# Patient Record
Sex: FEMALE
[De-identification: names, ages, dates, MRNs, and addresses within clinical notes are randomized per-mention and may not be internally consistent; named-entity substitution may affect disease eponyms.]

---

## 2023-01-04 ENCOUNTER — NURSE TRIAGE (OUTPATIENT)
Dept: OTHER | Facility: CLINIC | Age: 33
End: 2023-01-04

## 2023-01-04 NOTE — TELEPHONE ENCOUNTER
Location of patient: [place]    Subjective: Caller states \"I feel like someone is sitting on my chest\"     Current Symptoms: /95, chest pain, difficulty breathing    Onset: 1 hour ago; sudden    Associated Symptoms: NA    Recommended disposition: Go to ED Now, triaged up for symptoms of chest pain and difficulty breathing    Care advice provided, patient verbalizes understanding; denies any other questions or concerns; instructed to call back for any new or worsening symptoms. Patient/caller agrees to proceed to local Emergency Department    This triage is a result of a call to [address] [place] [address]. Please do not respond to the triage nurse through this encounter. Any subsequent communication should be directly with the patient.     Reason for Disposition   [2] Systolic BP  >= 932 OR Diastolic >= 80 AND [8] not taking BP medications    Protocols used: Blood Pressure - High-ADULT-

## 2023-01-26 PROBLEM — J34.89 NASAL CONGESTION WITH RHINORRHEA: Status: ACTIVE | Noted: 2023-01-26

## 2023-01-26 PROBLEM — R10.11 COLICKY RUQ ABDOMINAL PAIN: Status: RESOLVED | Noted: 2023-01-26 | Resolved: 2023-01-26

## 2023-01-26 PROBLEM — H66.90 OTITIS MEDIA: Status: RESOLVED | Noted: 2023-01-26 | Resolved: 2023-01-26

## 2023-01-26 PROBLEM — E01.0 THYROMEGALY: Status: ACTIVE | Noted: 2023-01-26

## 2023-01-26 PROBLEM — H69.91 DYSFUNCTION OF RIGHT EUSTACHIAN TUBE: Status: ACTIVE | Noted: 2023-01-26

## 2023-01-26 PROBLEM — K21.9 GASTROESOPHAGEAL REFLUX DISEASE WITHOUT ESOPHAGITIS: Status: ACTIVE | Noted: 2023-01-26

## 2023-01-26 PROBLEM — K91.89 POSTCHOLECYSTECTOMY DIARRHEA: Status: ACTIVE | Noted: 2023-01-26

## 2023-01-26 PROBLEM — R43.0 ANOSMIA: Status: ACTIVE | Noted: 2023-01-26

## 2023-01-26 PROBLEM — I88.9 LYMPHADENITIS: Status: RESOLVED | Noted: 2023-01-26 | Resolved: 2023-01-26

## 2023-01-26 PROBLEM — N39.0 ACUTE UTI: Status: ACTIVE | Noted: 2023-01-26

## 2023-01-26 PROBLEM — R51.9 HEADACHE: Status: ACTIVE | Noted: 2023-01-26

## 2023-01-26 PROBLEM — H74.01 TYMPANOSCLEROSIS OF RIGHT EAR: Status: ACTIVE | Noted: 2023-01-26

## 2023-01-26 PROBLEM — H66.91 OTITIS MEDIA, RIGHT: Status: ACTIVE | Noted: 2023-01-26

## 2023-01-26 PROBLEM — R05.9 COUGH IN ADULT PATIENT: Status: ACTIVE | Noted: 2023-01-26

## 2023-01-26 PROBLEM — T75.3XXA MOTION SICKNESS: Status: ACTIVE | Noted: 2023-01-26

## 2023-01-26 PROBLEM — J00 NASOPHARYNGITIS: Status: ACTIVE | Noted: 2023-01-26

## 2023-01-26 PROBLEM — R60.0 LEG EDEMA, LEFT: Status: RESOLVED | Noted: 2023-01-26 | Resolved: 2023-01-26

## 2023-01-26 PROBLEM — H93.8X9 SENSATION OF FULLNESS IN EAR: Status: ACTIVE | Noted: 2023-01-26

## 2023-01-26 PROBLEM — J20.9 ACUTE BRONCHITIS: Status: ACTIVE | Noted: 2023-01-26

## 2023-01-26 PROBLEM — R79.89 ELEVATED SERUM CREATININE: Status: ACTIVE | Noted: 2023-01-26

## 2023-01-26 PROBLEM — Z20.822 SUSPECTED COVID-19 VIRUS INFECTION: Status: RESOLVED | Noted: 2023-01-26 | Resolved: 2023-01-26

## 2023-01-26 PROBLEM — H10.31 ACUTE BACTERIAL CONJUNCTIVITIS OF RIGHT EYE: Status: ACTIVE | Noted: 2023-01-26

## 2023-01-26 PROBLEM — F41.9 ANXIETY: Status: ACTIVE | Noted: 2023-01-26

## 2023-01-26 PROBLEM — H66.90 OTITIS MEDIA: Status: ACTIVE | Noted: 2023-01-26

## 2023-01-26 PROBLEM — R09.81 NASAL CONGESTION WITH RHINORRHEA: Status: ACTIVE | Noted: 2023-01-26

## 2023-01-26 PROBLEM — J01.00 ACUTE NON-RECURRENT MAXILLARY SINUSITIS: Status: ACTIVE | Noted: 2023-01-26

## 2023-01-26 PROBLEM — J01.90 ACUTE SINUSITIS: Status: ACTIVE | Noted: 2023-01-26

## 2023-01-26 PROBLEM — R03.0 PREHYPERTENSION: Status: ACTIVE | Noted: 2023-01-26

## 2023-01-26 PROBLEM — R19.7 POSTCHOLECYSTECTOMY DIARRHEA: Status: ACTIVE | Noted: 2023-01-26

## 2023-01-26 PROBLEM — J02.9 SORE THROAT: Status: ACTIVE | Noted: 2023-01-26

## 2023-01-26 PROBLEM — R09.81 CONGESTION OF NASAL SINUS: Status: ACTIVE | Noted: 2023-01-26

## 2023-01-26 PROBLEM — L53.9 ACUTE ERYTHEMATOUS ERUPTION OF SKIN: Status: ACTIVE | Noted: 2023-01-26

## 2023-01-26 PROBLEM — K82.8 BILIARY DYSKINESIA: Status: RESOLVED | Noted: 2023-01-26 | Resolved: 2023-01-26

## 2023-01-26 RX ORDER — SCOLOPAMINE TRANSDERMAL SYSTEM 1 MG/1
1 PATCH, EXTENDED RELEASE TRANSDERMAL
COMMUNITY

## 2023-01-26 RX ORDER — PANTOPRAZOLE SODIUM 40 MG/1
1 TABLET, DELAYED RELEASE ORAL DAILY
COMMUNITY
Start: 2021-08-11 | End: 2023-07-18 | Stop reason: SDUPTHER

## 2023-01-26 RX ORDER — NORETHINDRONE ACETATE AND ETHINYL ESTRADIOL .03; 1.5 MG/1; MG/1
1 TABLET ORAL DAILY
COMMUNITY
Start: 2022-05-05

## 2023-01-26 RX ORDER — BUSPIRONE HYDROCHLORIDE 7.5 MG/1
TABLET ORAL
COMMUNITY
End: 2023-03-20 | Stop reason: SDUPTHER

## 2023-01-27 PROBLEM — R00.2 HEART PALPITATIONS: Status: ACTIVE | Noted: 2023-01-27

## 2023-01-27 PROBLEM — R49.0 HOARSENESS OF VOICE: Status: ACTIVE | Noted: 2023-01-27

## 2023-03-08 ENCOUNTER — TELEPHONE (OUTPATIENT)
Dept: PRIMARY CARE | Facility: CLINIC | Age: 33
End: 2023-03-08
Payer: COMMERCIAL

## 2023-03-08 NOTE — TELEPHONE ENCOUNTER
PATIENT CALLED REGARDING APPT TOMORROW AND SHE FEELS IT IS A WASTE OF TIME D/T FEELING GREAT AND ONLY TAKING BUSPAR 7.5MG ONLY IN EVENING AND NOT TWICE A DAY.  NO BLOOD PRESSURE OR OTHER ISSUES.    IF IT IS OKAY TO CANCEL PLEASE CALL HER [phone] OTHERWISE SHE WILL BE AT APPOINTMENT

## 2023-03-09 ENCOUNTER — APPOINTMENT (OUTPATIENT)
Dept: PRIMARY CARE | Facility: CLINIC | Age: 33
End: 2023-03-09
Payer: COMMERCIAL

## 2023-03-30 ENCOUNTER — TELEPHONE (OUTPATIENT)
Dept: PRIMARY CARE | Facility: CLINIC | Age: 33
End: 2023-03-30
Payer: COMMERCIAL

## 2023-04-11 DIAGNOSIS — F41.9 ANXIETY: Primary | ICD-10-CM

## 2023-04-12 RX ORDER — BUSPIRONE HYDROCHLORIDE 7.5 MG/1
TABLET ORAL
Qty: 60 TABLET | Refills: 2 | Status: SHIPPED | OUTPATIENT
Start: 2023-04-12

## 2023-07-17 ENCOUNTER — APPOINTMENT (OUTPATIENT)
Dept: PRIMARY CARE | Facility: CLINIC | Age: 33
End: 2023-07-17
Payer: COMMERCIAL

## 2023-07-18 DIAGNOSIS — K21.9 GASTROESOPHAGEAL REFLUX DISEASE WITHOUT ESOPHAGITIS: ICD-10-CM

## 2023-07-18 RX ORDER — PANTOPRAZOLE SODIUM 40 MG/1
40 TABLET, DELAYED RELEASE ORAL
Qty: 30 TABLET | Refills: 2 | Status: SHIPPED | OUTPATIENT
Start: 2023-07-18

## 2023-07-18 NOTE — TELEPHONE ENCOUNTER
Rx Refill Request Telephone Encounter    Name:  Lily Hernandez  :  [id number]  Medication Name:  pantoprazole (ProtoNix) 40 mg   Specific Pharmacy location:  [facility]  Date of last appointment:  2022  Date of next appointment:  8/10  Best number to reach patient:  [phone]

## 2023-08-10 ENCOUNTER — OFFICE VISIT (OUTPATIENT)
Dept: PRIMARY CARE | Facility: CLINIC | Age: 33
End: 2023-08-10
Payer: COMMERCIAL

## 2023-08-10 VITALS
DIASTOLIC BLOOD PRESSURE: 84 MMHG | TEMPERATURE: 97.7 F | OXYGEN SATURATION: 99 % | WEIGHT: 155.2 LBS | RESPIRATION RATE: 16 BRPM | HEART RATE: 104 BPM | BODY MASS INDEX: 24.36 KG/M2 | HEIGHT: 67 IN | SYSTOLIC BLOOD PRESSURE: 128 MMHG

## 2023-08-10 DIAGNOSIS — Z13.220 LIPID SCREENING: ICD-10-CM

## 2023-08-10 DIAGNOSIS — K59.00 CONSTIPATION, UNSPECIFIED CONSTIPATION TYPE: ICD-10-CM

## 2023-08-10 DIAGNOSIS — Z00.00 HEALTHCARE MAINTENANCE: Primary | ICD-10-CM

## 2023-08-10 DIAGNOSIS — F41.9 ANXIETY: ICD-10-CM

## 2023-08-10 DIAGNOSIS — R03.0 PREHYPERTENSION: ICD-10-CM

## 2023-08-10 DIAGNOSIS — K21.9 GASTROESOPHAGEAL REFLUX DISEASE WITHOUT ESOPHAGITIS: ICD-10-CM

## 2023-08-10 PROBLEM — H66.91 OTITIS MEDIA, RIGHT: Status: RESOLVED | Noted: 2023-01-26 | Resolved: 2023-08-10

## 2023-08-10 PROBLEM — J02.9 SORE THROAT: Status: RESOLVED | Noted: 2023-01-26 | Resolved: 2023-08-10

## 2023-08-10 PROBLEM — R09.81 NASAL CONGESTION WITH RHINORRHEA: Status: RESOLVED | Noted: 2023-01-26 | Resolved: 2023-08-10

## 2023-08-10 PROBLEM — R49.0 HOARSENESS OF VOICE: Status: RESOLVED | Noted: 2023-01-27 | Resolved: 2023-08-10

## 2023-08-10 PROBLEM — J34.89 NASAL CONGESTION WITH RHINORRHEA: Status: RESOLVED | Noted: 2023-01-26 | Resolved: 2023-08-10

## 2023-08-10 PROBLEM — H10.31 ACUTE BACTERIAL CONJUNCTIVITIS OF RIGHT EYE: Status: RESOLVED | Noted: 2023-01-26 | Resolved: 2023-08-10

## 2023-08-10 PROBLEM — E01.0 THYROMEGALY: Status: RESOLVED | Noted: 2023-01-26 | Resolved: 2023-08-10

## 2023-08-10 PROBLEM — J01.90 ACUTE SINUSITIS: Status: RESOLVED | Noted: 2023-01-26 | Resolved: 2023-08-10

## 2023-08-10 PROBLEM — R09.81 CONGESTION OF NASAL SINUS: Status: RESOLVED | Noted: 2023-01-26 | Resolved: 2023-08-10

## 2023-08-10 PROBLEM — L53.9 ACUTE ERYTHEMATOUS ERUPTION OF SKIN: Status: RESOLVED | Noted: 2023-01-26 | Resolved: 2023-08-10

## 2023-08-10 PROBLEM — J00 NASOPHARYNGITIS: Status: RESOLVED | Noted: 2023-01-26 | Resolved: 2023-08-10

## 2023-08-10 PROBLEM — N39.0 ACUTE UTI: Status: RESOLVED | Noted: 2023-01-26 | Resolved: 2023-08-10

## 2023-08-10 PROBLEM — J01.00 ACUTE NON-RECURRENT MAXILLARY SINUSITIS: Status: RESOLVED | Noted: 2023-01-26 | Resolved: 2023-08-10

## 2023-08-10 PROBLEM — J20.9 ACUTE BRONCHITIS: Status: RESOLVED | Noted: 2023-01-26 | Resolved: 2023-08-10

## 2023-08-10 PROBLEM — R05.9 COUGH IN ADULT PATIENT: Status: RESOLVED | Noted: 2023-01-26 | Resolved: 2023-08-10

## 2023-08-10 PROCEDURE — 3008F BODY MASS INDEX DOCD: CPT | Performed by: FAMILY MEDICINE

## 2023-08-10 PROCEDURE — 99395 PREV VISIT EST AGE 18-39: CPT | Performed by: FAMILY MEDICINE

## 2023-08-10 PROCEDURE — 1036F TOBACCO NON-USER: CPT | Performed by: FAMILY MEDICINE

## 2023-08-10 ASSESSMENT — PROMIS GLOBAL HEALTH SCALE
CARRYOUT_SOCIAL_ACTIVITIES: EXCELLENT
RATE_SOCIAL_SATISFACTION: EXCELLENT
RATE_QUALITY_OF_LIFE: EXCELLENT
CARRYOUT_PHYSICAL_ACTIVITIES: COMPLETELY
RATE_AVERAGE_FATIGUE: MILD
RATE_AVERAGE_PAIN: 0
RATE_GENERAL_HEALTH: VERY GOOD
RATE_MENTAL_HEALTH: VERY GOOD
RATE_PHYSICAL_HEALTH: GOOD
EMOTIONAL_PROBLEMS: NEVER

## 2023-08-10 ASSESSMENT — PATIENT HEALTH QUESTIONNAIRE - PHQ9
1. LITTLE INTEREST OR PLEASURE IN DOING THINGS: NOT AT ALL
SUM OF ALL RESPONSES TO PHQ9 QUESTIONS 1 & 2: 0
2. FEELING DOWN, DEPRESSED OR HOPELESS: NOT AT ALL

## 2023-08-10 NOTE — PROGRESS NOTES
"Subjective   Patient ID: Lily Hernandez is a 32 y.o. female who presents for Annual Exam. I last saw the patient on 1/18/23.    HPI   Patient states that she has not had a BM since Saturday. Patient states that she has tried Miralax and things of that nature to no avail. Patient has been checking for bowel sounds and has been hearing movement. Patient admits to bloating, and light abdominal pain. Denies vomiting but admits to nausea.     Patient is here for a physical and has forgotten the form at her job.     Patient has stopped taking her Protonix on Monday.    Review of Systems  Except positives as noted in the CC & HPI      Constitutional: Denies fevers, chills, night sweats, fatigue, weight changes, change in appetite    Eyes: Denies blurry vision, double vision    ENT: Denies otalgia, trouble hearing, tinnitus, vertigo, nasal congestion, rhinorrhea, sore throat    Neck: Denies swelling, masses    Cardiovascular: Denies chest pain, palpitations, edema, orthopnea, syncope    Respiratory: Denies dyspnea, cough, wheezing, postural nocturnal dyspnea    Gastrointestinal: Denies abdominal pain, nausea, vomiting, diarrhea, constipation, melena, hematochezia    Genitourinary: Denies dysuria, hematuria, frequency, urgency    Musculoskeletal: Denies back pain, neck pain, arthralgias, myalgias    Integumentary: Denies skin lesions, rashes, masses    Neurological: Denies dizziness, headaches, confusion, limb weakness, paresthesias, syncope, convulsions    Psychiatric: Denies depression, anxiety, homicidal ideations, suicidal ideations, sleep disturbances    Endocrine: Denies polyphagia, polydipsia, polyuria, weakness, hair thinning, heat intolerance, cold intolerance, weight changes    Heme/Lymph: Denies easy bruising, easy bleeding, swollen glands     Objective   /84 (BP Location: Right arm, Patient Position: Sitting)   Pulse 104   Temp 36.5 °C (97.7 °F)   Resp 16   Ht 1.702 m (5' 7\")   Wt 70.4 kg (155 lb 3.2 oz) "   SpO2 99%   BMI 24.31 kg/m²     Physical Exam  128/84 on recheck of BP in the right arm.     Gen. Appearance - well-developed, well-nourished, 32 y.o., White female in no acute distress.     Skin - warm, pink and dry without rash or concerning lesions.     Mental Status - alert and oriented times 3. Normal mood and affect appropriate to mood.     Ears - TMs shiny and move well with insufflation. Ear canals are clear bilaterally.     Mouth - pharynx is pink without exudates. Dentition is normal appearing. Tongue and uvula move in the midline.     Neck - supple without lymphadenopathy. Carotid pulses are normal without bruits. Thyroid is mildly enlarged in midline without nodules.     Chest - lungs are clear to auscultation without rales, rhonchi or wheezes.    Heart - regular, rate, and rhythm without murmurs, rubs or gallops.     Abdomen - soft, flat, nontender, nondistended. No masses, hepatomegaly or splenomegaly is noted. No rebound, rigidity or guarding is noted. Bowel sounds are normoactive. Scar of the mid abdomen from nephrectomy.     Extremities - no cyanosis, clubbing or edema. Pedal pulses are 2+ normal at the dorsalis pedis and posterior tibial pulses bilaterally.     Neurological - cranial nerves II through XII are grossly intact. Motor strength 5/5 at all fours.     Assessment/Plan   1. Healthcare maintenance        2. Prehypertension  CBC and Auto Differential    Comprehensive Metabolic Panel    TSH with reflex to Free T4 if abnormal      3. Constipation, unspecified constipation type        4. Gastroesophageal reflux disease without esophagitis        5. Anxiety        6. Body mass index (BMI) of 24.0-24.9 in adult  CBC and Auto Differential    Comprehensive Metabolic Panel      7. Lipid screening  Lipid Panel      Patient to continue current medications (with any exceptions as noted) and diet. Follow-up in 6-12 month(s) otherwise as needed.      Patient is to return for fasting CBC, CMP, lipid  panel, TSH labs at their convenience prior to her next appointment. Fasting is nothing to eat or drink except water or black coffee for 8-12 hours. Will call patient with results when available.     Patient was given a #4 sample of Linzess 72 mcg. Advised her to take it on a day when she will be staying home, the medication does work very effectively.     Patient will take Pantoprazole on an as-needed basis.     Patient was advised to limit their salt intake and to not add any extra salt to their food. Patient is to avoid pretzels, chips, lunch meats, canned soups, soda pop, ham, howard, hot dogs, etc.         Scribe Attestation  By signing my name below, IHortensia, Scribe   attest that this documentation has been prepared under the direction and in the presence of Gilmar Khan MD.

## 2023-08-10 NOTE — PATIENT INSTRUCTIONS
Patient to continue current medications (with any exceptions as noted) and diet. Follow-up in 6-12 month(s) otherwise as needed.      Patient is to return for fasting CBC, CMP, lipid panel, TSH labs at their convenience prior to her next appointment. Fasting is nothing to eat or drink except water or black coffee for 8-12 hours. Will call patient with results when available.     Patient was given a #4 sample of Linzess. Advised her to take it on a day when she will be staying home.     Patient will take Pantoprazole on an as-needed basis.     Patient was advised to limit their salt intake and to not add any extra salt to their food. Patient is to avoid pretzels, chips, lunch meats, canned soups, soda pop, ham, howard, hot dogs, etc.

## 2023-08-17 ENCOUNTER — TELEMEDICINE (OUTPATIENT)
Dept: PRIMARY CARE | Facility: CLINIC | Age: 33
End: 2023-08-17
Payer: COMMERCIAL

## 2023-08-17 VITALS — BODY MASS INDEX: 24.31 KG/M2 | HEIGHT: 67 IN | DIASTOLIC BLOOD PRESSURE: 77 MMHG | SYSTOLIC BLOOD PRESSURE: 118 MMHG

## 2023-08-17 DIAGNOSIS — U07.1 COVID-19 VIRUS INFECTION: Primary | ICD-10-CM

## 2023-08-17 DIAGNOSIS — R43.0 ANOSMIA: ICD-10-CM

## 2023-08-17 PROCEDURE — 99212 OFFICE O/P EST SF 10 MIN: CPT | Performed by: FAMILY MEDICINE

## 2023-08-17 ASSESSMENT — PATIENT HEALTH QUESTIONNAIRE - PHQ9
1. LITTLE INTEREST OR PLEASURE IN DOING THINGS: NOT AT ALL
2. FEELING DOWN, DEPRESSED OR HOPELESS: NOT AT ALL
SUM OF ALL RESPONSES TO PHQ9 QUESTIONS 1 & 2: 0

## 2023-08-17 NOTE — PATIENT INSTRUCTIONS
Patient was instructed to drink plenty of fluids. Take Tylenol or Advil for pain or fever. Follow up if signs or symptoms persist or worsen otherwise when necessary. Patient may take Mucinex DM OTC as directed for cough and Flonase (fluticasone) nasal spray OTC as directed for nasal and sinus congestion.    Patient was started on:   Paxlovid 300 mg, TAKE 3 TABS TWICE DAILY FOR 5 DAYS    Rx(s) sent to pharmacy.     If she notices any chest pain or SOB, she will go to the ER.

## 2023-08-17 NOTE — PROGRESS NOTES
Subjective   Patient ID: Lily Hernandez is a 32 y.o. female who presents for Covid 19 Positive and Fever. I last saw the patient on 8/10/2023.     HPI   Home positive COVID test. BP at home 118/77 and resting HR at 120.     Patient states that her symptoms started on Tuesday. She developed a cough with a tingling feeling in her lungs as well as a raspy voice. Denies chest pain, SOB. She admits to sinus pressure.     Patient reports waking up with chills, body aches before she went to work yesterday. She came home and took a Covid test and it was positive. Patient has been having chills and a fever as well. Patient states that last night she had a fever of 104.3 and got it down to 101. She states that it has not been below 101 degrees. Her last temp was 99.3 with Tylenol.     Patient called the on-call line and was recommended to follow up with PCP for Paxlovid. Patient has been taking Tylenol and Motrin back and forth.     Review of Systems  Except positives as noted in the CC & HPI      Constitutional: Denies night sweats, fatigue, weight changes, change in appetite    Eyes: Denies blurry vision, double vision    ENT: Denies otalgia, trouble hearing, tinnitus, vertigo, rhinorrhea  Neck: Denies swelling, masses    Cardiovascular: Denies chest pain, palpitations, edema, orthopnea, syncope    Respiratory: Denies dyspnea, wheezing, postural nocturnal dyspnea    Gastrointestinal: Denies abdominal pain, nausea, vomiting, diarrhea, constipation, melena, hematochezia    Genitourinary: Denies dysuria, hematuria, frequency, urgency    Musculoskeletal: Denies back pain, neck pain, arthralgias, myalgias    Integumentary: Denies skin lesions, rashes, masses    Neurological: Denies dizziness, headaches, confusion, limb weakness, paresthesias, syncope, convulsions    Psychiatric: Denies depression, anxiety, homicidal ideations, suicidal ideations, sleep disturbances    Endocrine: Denies polyphagia, polydipsia, polyuria, weakness,  "hair thinning, heat intolerance, cold intolerance, weight changes    Heme/Lymph: Denies easy bruising, easy bleeding, swollen glands    Objective   /77   Ht 1.702 m (5' 7\")   BMI 24.31 kg/m²     Physical Exam  Telehealth visit.    Patient does sound well on telephone without apparent distress. She does have a cough.     Assessment/Plan   1. COVID-19 virus infection  nirmatrelvir-ritonavir (PAXLOVID) 300 mg (150 mg x 2)-100 mg tablet therapy pack      2. Anosmia        Patient was instructed to drink plenty of fluids. Take Tylenol or Advil for pain or fever. Follow up if signs or symptoms persist or worsen otherwise when necessary. Patient may take Mucinex DM OTC as directed for cough and Flonase (fluticasone) nasal spray OTC as directed for nasal and sinus congestion.    Patient was started on:   Paxlovid 300 mg, TAKE 3 TABS TWICE DAILY FOR 5 DAYS    Rx(s) sent to pharmacy.     If she notices any chest pain or SOB, she will go to the ER.       Scribe Attestation  By signing my name below, IHortensia Scribe   attest that this documentation has been prepared under the direction and in the presence of Gilmar Khan MD.   "

## 2023-11-05 ENCOUNTER — TELEMEDICINE (OUTPATIENT)
Dept: PRIMARY CARE | Facility: CLINIC | Age: 33
End: 2023-11-05
Payer: COMMERCIAL

## 2023-11-05 DIAGNOSIS — R09.81 SINUS CONGESTION: Primary | ICD-10-CM

## 2023-11-05 DIAGNOSIS — J02.9 SORE THROAT: ICD-10-CM

## 2023-11-05 PROCEDURE — 99214 OFFICE O/P EST MOD 30 MIN: CPT | Performed by: FAMILY MEDICINE

## 2023-11-05 RX ORDER — AZITHROMYCIN 500 MG/1
1000 TABLET, FILM COATED ORAL ONCE
Qty: 2 TABLET | Refills: 0 | Status: SHIPPED | OUTPATIENT
Start: 2023-11-05 | End: 2023-11-05

## 2023-11-05 ASSESSMENT — ENCOUNTER SYMPTOMS
SORE THROAT: 1
HOARSE VOICE: 1
SINUS COMPLAINT: 1
SINUS PRESSURE: 1
SHORTNESS OF BREATH: 0
NECK PAIN: 0
COUGH: 1
SWOLLEN GLANDS: 0
CHILLS: 0
DIAPHORESIS: 0
HEADACHES: 1

## 2023-11-05 NOTE — PATIENT INSTRUCTIONS
Start antibiotics  Call if any worse or if no improvement in 3-5 days  Increase fluids  OTC decongestants as needed  Saline and OTC flonase as needed  Current Outpatient Medications   Medication Sig Dispense Refill    azithromycin (Zithromax) 500 mg tablet Take 2 tablets (1,000 mg) by mouth 1 time for 1 dose. 2 tablet 0    busPIRone (Buspar) 7.5 mg tablet TAKE 1 TABLET ONCE DAILY AT BEDTIME FOR 1 WEEK, THEN 1 TWICE A DAY THEREAFTER 60 tablet 2    Lactobacillus acidophilus (PROBIOTIC ORAL)       norethindrone ac-eth estradioL (Junel 1.5/30, 21,) 1.5-30 mg-mcg tablet tablet Take 1 tablet by mouth once daily.      pantoprazole (ProtoNix) 40 mg EC tablet Take 1 tablet (40 mg) by mouth once daily in the morning. Take before meals. 30 tablet 2    scopolamine (Transderm-Scop) 1 mg over 3 days patch 3 day Place 1 patch on the skin every 3rd day.       No current facility-administered medications for this visit.

## 2023-11-05 NOTE — PROGRESS NOTES
Subjective   Patient ID: Lily Hernandez is a 33 y.o. female who presents for No chief complaint on file..  Today she is accompanied by alone.     Sinus Problem  This is a new problem. The current episode started in the past 7 days. The problem has been gradually worsening since onset. There has been no fever. Associated symptoms include congestion, coughing, ear pain, headaches, a hoarse voice, sinus pressure and a sore throat. Pertinent negatives include no chills, diaphoresis, neck pain, shortness of breath, sneezing or swollen glands. Past treatments include saline nose sprays (robitussine and tylenol cold and sinus). The treatment provided no relief.       Review of Systems   Constitutional:  Negative for chills and diaphoresis.   HENT:  Positive for congestion, ear pain, hoarse voice, sinus pressure and sore throat. Negative for sneezing.    Respiratory:  Positive for cough. Negative for shortness of breath.    Musculoskeletal:  Negative for neck pain.   Neurological:  Positive for headaches.       Objective   There were no vitals taken for this visit.  BSA: There is no height or weight on file to calculate BSA.  Growth percentiles: Facility age limit for growth %mehrdad is 20 years. Facility age limit for growth %mehrdad is 20 years.     Physical Exam  Nursing note reviewed.   Constitutional:       Appearance: Normal appearance.   Pulmonary:      Effort: Pulmonary effort is normal. No respiratory distress.   Neurological:      Mental Status: She is alert.   Psychiatric:         Mood and Affect: Mood normal.         Behavior: Behavior normal.         Thought Content: Thought content normal.         Judgment: Judgment normal.         Assessment/Plan   Problem List Items Addressed This Visit    None  Visit Diagnoses       Diagnosis Codes    Sinus congestion    -  Primary R09.81    Relevant Medications    azithromycin (Zithromax) 500 mg tablet    Sore throat     J02.9    Relevant Medications    azithromycin (Zithromax)  500 mg tablet          Start antibiotics  Call if any worse or if no improvement in 3-5 days  Increase fluids  OTC decongestants as needed  Saline and OTC flonase as needed  You need anything else please reach out to us or your PCP

## 2023-11-13 LAB
ALANINE AMINOTRANSFERASE (SGPT) (U/L) IN SER/PLAS: 20 U/L
ALKALINE PHOSPHATASE (U/L) IN SER/PLAS: 56 U/L
ASPARTATE AMINOTRANSFERASE (SGOT) (U/L) IN SER/PLAS: 15 U/L
BILIRUBIN, TOTAL  (MG/DL) IN SER/PLAS: 0.4 MG/DL (ref 0–1.2)
CHOLESTEROL (MG/DL) IN SER/PLAS: 138 MG/DL
CHOLESTEROL IN LDL/IN HDL (MASS RATIO) IN SER/PLAS: 3.4
CREATININE (MG/DL) IN SER/PLAS: 0.9 MG/DL
GLUCOSE: 85 MG/DL
HDL-CHOLESTEROL (MG/DL) IN SER/PLAS: 41 MG/DL
HEMATOCRIT (%) IN BLOOD BY AUTOMATED COUNT: 38 % (ref 36–46)
HEMOGLOBIN (G/DL) IN BLOOD: 12.6 G/DL (ref 12–16)
LDL CHOLESTEROL: 74 MG/DL
NON-UH HIE A/G RATIO: 1.2
NON-UH HIE ALB: 3.7 G/DL (ref 3.4–5)
NON-UH HIE ALK PHOS: 56 UNIT/L (ref 45–117)
NON-UH HIE BASO COUNT: 0.03 X1000 (ref 0–0.2)
NON-UH HIE BASOS %: 0.6 %
NON-UH HIE BILIRUBIN, TOTAL: 0.4 MG/DL (ref 0.3–1.2)
NON-UH HIE BUN/CREAT RATIO: 11.1
NON-UH HIE BUN: 10 MG/DL (ref 9–23)
NON-UH HIE CALCIUM: 9 MG/DL (ref 8.7–10.4)
NON-UH HIE CALCULATED LDL CHOLESTEROL: 74 MG/DL (ref 60–130)
NON-UH HIE CALCULATED OSMOLALITY: 272 MOSM/KG (ref 275–295)
NON-UH HIE CHLORIDE: 106 MMOL/L (ref 98–107)
NON-UH HIE CHOLESTEROL: 138 MG/DL (ref 100–200)
NON-UH HIE CO2, VENOUS: 27 MMOL/L (ref 20–31)
NON-UH HIE CREATININE: 0.9 MG/DL (ref 0.5–0.8)
NON-UH HIE DIFF?: NO
NON-UH HIE EOS COUNT: 0.06 X1000 (ref 0–0.5)
NON-UH HIE EOSIN %: 1.3 %
NON-UH HIE GFR AA: >60
NON-UH HIE GLOBULIN: 3.1 G/DL
NON-UH HIE GLOMERULAR FILTRATION RATE: >60 ML/MIN/1.73M?
NON-UH HIE GLUCOSE: 85 MG/DL (ref 74–106)
NON-UH HIE GOT: 15 UNIT/L (ref 15–37)
NON-UH HIE GPT: 20 UNIT/L (ref 10–49)
NON-UH HIE HCT: 38 % (ref 36–46)
NON-UH HIE HDL CHOLESTEROL: 41 MG/DL (ref 40–60)
NON-UH HIE HGB: 12.6 G/DL (ref 12–16)
NON-UH HIE INSTR WBC: 4.7
NON-UH HIE K: 4.1 MMOL/L (ref 3.5–5.1)
NON-UH HIE LYMPH %: 36.1 %
NON-UH HIE LYMPH COUNT: 1.71 X1000 (ref 1.2–4.8)
NON-UH HIE MCH: 29 PG (ref 27–34)
NON-UH HIE MCHC: 33.3 G/DL (ref 32–37)
NON-UH HIE MCV: 87.2 FL (ref 80–100)
NON-UH HIE MONO %: 10 %
NON-UH HIE MONO COUNT: 0.48 X1000 (ref 0.1–1)
NON-UH HIE MPV: 7.7 FL (ref 7.4–10.4)
NON-UH HIE NA: 137 MMOL/L (ref 135–145)
NON-UH HIE NEUTROPHIL %: 51.9 %
NON-UH HIE NEUTROPHIL COUNT (ANC): 2.46 X1000 (ref 1.4–8.8)
NON-UH HIE NUCLEATED RBC: 0 /100WBC
NON-UH HIE PLATELET: 279 X10 (ref 150–450)
NON-UH HIE RBC: 4.36 X10 (ref 4.2–5.4)
NON-UH HIE RDW: 12.5 % (ref 11.5–14.5)
NON-UH HIE TOTAL CHOL/HDL CHOL RATIO: 3.4
NON-UH HIE TOTAL PROTEIN: 6.8 G/DL (ref 5.7–8.2)
NON-UH HIE TRIGLYCERIDES: 117 MG/DL (ref 30–150)
NON-UH HIE TSH: 2.25 UIU/ML (ref 0.55–4.78)
NON-UH HIE WBC: 4.7 X10 (ref 4.5–11)
PLATELETS (10*3/UL) IN BLOOD AUTOMATED COUNT: 279 X10*3/UL (ref 150–450)
POTASSIUM (MMOL/L) IN SER/PLAS: 4.1 MMOL/L
SODIUM (MMOL/L) IN SER/PLAS: 137 MMOL/L
THYROID STIMULATING HORMONE (MIU/L) IN SER/PLAS: 2.25 MIU/L
TRIGLYCERIDES  (MG/DL) IN SER/PLAS: 117 MG/DL
UREA NITROGEN (MG/DL) IN SER/PLAS: 10 MG/DL

## 2024-02-15 ENCOUNTER — TELEPHONE (OUTPATIENT)
Dept: PRIMARY CARE | Facility: CLINIC | Age: 34
End: 2024-02-15
Payer: COMMERCIAL

## 2024-02-15 NOTE — TELEPHONE ENCOUNTER
Patient called in stating she thinks she may have an ear infection. She stated the inside of her ears feel wet and the vision in her right eye is off. She is concerned the ear infection is messing with her equilibrium. Patient wondering if she can be prescribed an antibiotic or if Dr. Khan could recommend anything?  Please Advise

## 2024-03-13 DIAGNOSIS — J01.90 ACUTE NON-RECURRENT SINUSITIS, UNSPECIFIED LOCATION: Primary | ICD-10-CM

## 2024-03-13 RX ORDER — AZITHROMYCIN 250 MG/1
TABLET, FILM COATED ORAL
Qty: 6 TABLET | Refills: 0 | Status: SHIPPED | OUTPATIENT
Start: 2024-03-13 | End: 2024-03-17

## 2024-07-10 ENCOUNTER — TELEPHONE (OUTPATIENT)
Dept: PRIMARY CARE | Facility: CLINIC | Age: 34
End: 2024-07-10
Payer: COMMERCIAL

## 2024-07-10 NOTE — TELEPHONE ENCOUNTER
Patient called stating that she is tired a lot more so now than lately. 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 She was wondering what type of supplements she can take to keep her awake or what vitamin levels could be dropping her. Please advise

## 2024-08-29 ENCOUNTER — APPOINTMENT (OUTPATIENT)
Dept: PRIMARY CARE | Facility: CLINIC | Age: 34
End: 2024-08-29
Payer: COMMERCIAL

## 2024-08-29 VITALS
BODY MASS INDEX: 26.21 KG/M2 | DIASTOLIC BLOOD PRESSURE: 86 MMHG | WEIGHT: 167 LBS | TEMPERATURE: 97.5 F | SYSTOLIC BLOOD PRESSURE: 132 MMHG | OXYGEN SATURATION: 98 % | RESPIRATION RATE: 16 BRPM | HEART RATE: 58 BPM | HEIGHT: 67 IN

## 2024-08-29 DIAGNOSIS — R53.82 CHRONIC FATIGUE: ICD-10-CM

## 2024-08-29 DIAGNOSIS — E01.0 THYROMEGALY: ICD-10-CM

## 2024-08-29 DIAGNOSIS — Z00.00 WELLNESS EXAMINATION: Primary | ICD-10-CM

## 2024-08-29 DIAGNOSIS — E66.3 OVERWEIGHT WITH BODY MASS INDEX (BMI) OF 26 TO 26.9 IN ADULT: ICD-10-CM

## 2024-08-29 DIAGNOSIS — R60.0 FLUID RETENTION IN LEGS: ICD-10-CM

## 2024-08-29 DIAGNOSIS — R03.0 PREHYPERTENSION: ICD-10-CM

## 2024-08-29 PROCEDURE — 99395 PREV VISIT EST AGE 18-39: CPT | Performed by: FAMILY MEDICINE

## 2024-08-29 PROCEDURE — 3008F BODY MASS INDEX DOCD: CPT | Performed by: FAMILY MEDICINE

## 2024-08-29 RX ORDER — CHLORTHALIDONE 25 MG/1
25 TABLET ORAL DAILY
Qty: 30 TABLET | Refills: 5 | Status: SHIPPED | OUTPATIENT
Start: 2024-08-29 | End: 2025-02-25

## 2024-08-29 ASSESSMENT — PROMIS GLOBAL HEALTH SCALE
EMOTIONAL_PROBLEMS: OFTEN
RATE_AVERAGE_FATIGUE: MODERATE
RATE_AVERAGE_PAIN: 0
CARRYOUT_SOCIAL_ACTIVITIES: FAIR
RATE_PHYSICAL_HEALTH: FAIR
RATE_QUALITY_OF_LIFE: GOOD
RATE_SOCIAL_SATISFACTION: GOOD
RATE_MENTAL_HEALTH: FAIR
RATE_GENERAL_HEALTH: GOOD
CARRYOUT_PHYSICAL_ACTIVITIES: COMPLETELY

## 2024-08-29 ASSESSMENT — PATIENT HEALTH QUESTIONNAIRE - PHQ9
SUM OF ALL RESPONSES TO PHQ9 QUESTIONS 1 AND 2: 0
1. LITTLE INTEREST OR PLEASURE IN DOING THINGS: NOT AT ALL
2. FEELING DOWN, DEPRESSED OR HOPELESS: NOT AT ALL

## 2024-08-29 NOTE — PROGRESS NOTES
Subjective   Patient ID: Lily Hernandez is a 33 y.o. female who presents for Annual Exam. I last saw the patient on 8/17/2023    HPI     Patient is here for a ANNUAL EXAM    Past medical, surgical, and family history reviewed.  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 She was told to take her vitamins or a multivitamin. She does mentions going home from work tired more than usual.    She gained weight 20 lbs in a year and is concerned. She mentions retaining water. She is trying to eat a healthy and balanced diet along with exercise and staying active. She mentions stress is huge at work and is wondering if that was a reason for weight gain.    She is seeing a migraine specialist for her migraines.      Review of Systems  Except positives as noted in the CC & HPI      Constitutional: Denies fevers, chills, night sweats, fatigue, weight changes, change in appetite    Eyes: Denies blurry vision, double vision    ENT: Denies otalgia, trouble hearing, tinnitus, vertigo, nasal congestion, rhinorrhea, sore throat    Neck: Denies swelling, masses    Cardiovascular: Denies chest pain, palpitations, edema, orthopnea, syncope    Respiratory: Denies dyspnea, cough, wheezing, postural nocturnal dyspnea    Gastrointestinal: Denies abdominal pain, nausea, vomiting, diarrhea, constipation, melena, hematochezia    Genitourinary: Denies dysuria, hematuria, frequency, urgency    Musculoskeletal: Denies back pain, neck pain, arthralgias, myalgias    Integumentary: Denies skin lesions, rashes, masses    Neurological: Denies dizziness, headaches, confusion, limb weakness, paresthesias, syncope, convulsions    Psychiatric: Denies depression, anxiety, homicidal ideations, suicidal ideations, sleep disturbances    Endocrine: Denies polyphagia, polydipsia, polyuria, weakness, hair  "thinning, heat intolerance, cold intolerance, weight changes    Heme/Lymph: Denies easy bruising, easy bleeding, swollen glands     Objective   /86 (BP Location: Right arm, Patient Position: Sitting)   Pulse 58   Temp 36.4 °C (97.5 °F)   Resp 16   Ht 1.702 m (5' 7\")   Wt 75.8 kg (167 lb)   LMP  (LMP Unknown)   SpO2 98%   BMI 26.16 kg/m²     Physical Exam  128/84 on recheck of BP in the right arm.     Gen. Appearance - well-developed, well-nourished, 33 y.o., White female in no acute distress.     Skin - warm, pink and dry without rash or concerning lesions.     Mental Status - alert and oriented times 3. Normal mood and affect appropriate to mood.     Neck - supple without lymphadenopathy. Carotid pulses are normal without bruits. Thyroid is mildly enlarged in midline without nodules.     Chest - lungs are clear to auscultation without rales, rhonchi or wheezes.    Heart - regular, rate, and rhythm without murmurs, rubs or gallops.     Abdomen - soft, flat, nontender, nondistended. No masses, hepatomegaly or splenomegaly is noted. No rebound, rigidity or guarding is noted. Bowel sounds are normoactive. Scar of the mid abdomen from nephrectomy. Possible ventral abdominal hernia superior to the umbilicus.     Extremities - no cyanosis, clubbing or edema. Pedal pulses are 2+ normal at the dorsalis pedis and posterior tibial pulses bilaterally.     Neurological - cranial nerves II through XII are grossly intact. Motor strength 5/5 at all fours.     Assessment/Plan   1. Wellness examination  CBC and Auto Differential    Comprehensive Metabolic Panel    Lipid Panel    TSH with reflex to Free T4 if abnormal    Hemoglobin A1C      2. Chronic fatigue  Vitamin B12    Vitamin D 25-Hydroxy,Total (for eval of Vitamin D levels)    Folate    Ferritin      3. Prehypertension  chlorthalidone (Hygroton) 25 mg tablet      4. Thyromegaly  US thyroid      5. Fluid retention in legs  chlorthalidone (Hygroton) 25 mg tablet "      6. Overweight with body mass index (BMI) of 26 to 26.9 in adult          For weight management I recommend exercising and remaining physically active. Try to maintain a heathy diet that includes green leafy vegetables, fruits and proteins. You are encouraged to stay well hydrated.    Patient was advised to limit their salt intake and to not add any extra salt to their food. Patient is to avoid pretzels, chips, lunch meats, canned soups, soda pop, ham, howard, hot dogs, etc.    I have ordered an ultrsaound of the thyroid. Please have it done at your earliest convenience.     Will obtain CBC with Differential, Comprehensive Metabolic, Lipid, Hemoglobin A1c, Vitamin D, Vitamin B12, TSH, Iron and TIBC, Folate, and Ferritin  prior to patient's next appointment. Will call patient with results when available.    Patient was started on:   chlorthalidone (Hygroton) 25 mg tablet. Take 1 tablet (25 mg) by mouth once daily.    Rx(s) sent to pharmacy.    Medication should help with her fluid retention, headaches and pre-hypertension.    Patient to continue current medications (with any exceptions as noted) and diet. Follow-up in 2 month(s) otherwise as needed.     Please consider getting the flu shot in the fall.      Scribe Attestation  By signing my name below, ISuzanne, Jim   attest that this documentation has been prepared under the direction and in the presence of Gilmar Khan MD.     This note has been transcribed using a medical scribe and there is a possibility of unintentional typing misprints.

## 2024-09-07 NOTE — PROGRESS NOTES
"Chief Complaint   Patient presents with    New Patient Visit    Migraine     Slight headache daily. Either on right side or left side. Only 1 kidney so has to be careful on what she takes. Will take Excedrin if she absolutely has to. Normally just tylenol. Can range from 3-7/10. Really bright lights are a trigger. When she gets \"big ones, that has stoke like symptoms\" they are random and no triggers really.        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   MAGAN Hernandez is a 33 y.o. year old female who presents with chief complaint Chronic daily headache [R51.9]. She explains that she gets varying types of headaches, ranging from an everyday headache that sometimes progresses into a migraine, and rarely, she experiences a hemiplegic migraine with accompanying \"stroke-like\" symptoms. 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       Chronic Daily Headache/ Chronic Migraine Without Aura  She states that her everyday headaches have been occurring since the age of 29, occur 30 days per month. Start in the morning, sometimes lighten up, sometimes progress to a migraine. Unilateral right or left frontotemporal, pinpoint intensity.  Length of the everyday headaches varies. If the headaches persist/ evolve, she continues to work and deal with the symptoms. Prefers to be away from noise and loud noises \"I drive in my car with no radio on, just silence.\" No accompanying symptoms if nausea/photophobia. No lacrimation, congestion, or rhinorrhea. Pain level is 3-5/10.  She states that Tylenol often helps to relieve this type of headache.       Hemiplegic Migraine Headaches   Lily started getting migraine headaches since age 20.  (Worse after having children.)   Seem to be stroke-like symptoms. She recalls being hospitalized for TIA symptoms when she was younger, it was determined to be a hemiplegic migraine.   The headaches are usually  pressure  and are " "located holocephalic, starting in the back of the head radiating holocephalic, generally symmetric. Symptoms on the right side are worse.   The patient rates the most severe headaches a 7 in intensity.   Generally, headaches last about 3 hours in duration.   Starts with vision changes. Blurry/ depth perception feels \"off\" when it occurs. Associated vestibular symptoms. Right arm numbness/tingling up into the face. Memory lapses, difficulty talking/ confusion. Numbness/tingling tongue. No difficulty walking/change to gait. (Co-workers once called stroke alert due to the presentation of her symptoms).   Headaches are not worsened with exertion. Laying down doesn't change the symptoms.   Dehydration is thought to be a trigger.   Aura vision changes occurs 30 minutes prior to headache.   Has seen Dr. Nolasco at [facility], diagnosed her with complex migraines.  No family history of migraines.   Sleeps 6 hours a night. Young children (age 3 & 5) wake her throughout the night.   Denies history of head or neck injuries.    *Status of family planning: does not desire pregnancy now or in the near future. Currently on BCP.       Medications  Current & Past Treatments:  Preventive:  None  Keeps hydrated.       Rescue:  Tylenol 500 mg x 2.   Sumatriptan- made migraine feel more intense  Fioricet- takes once every 2 weeks for her everyday headaches that progress into mild migraines.      Current Outpatient Medications:     chlorthalidone (Hygroton) 25 mg tablet, Take 1 tablet (25 mg) by mouth once daily., Disp: 30 tablet, Rfl: 5    cyanocobalamin (Vitamin B-12) 50 mcg tablet, Take 1 tablet (50 mcg) by mouth once daily., Disp: , Rfl:     Lactobacillus acidophilus (PROBIOTIC ORAL), , Disp: , Rfl:     norethindrone ac-eth estradioL (Junel 1.5/30, 21,) 1.5-30 mg-mcg tablet tablet, Take 1 tablet by mouth once daily., Disp: , Rfl:     amitriptyline (Elavil) 25 mg tablet, Take 1 tablet (25 mg) by mouth once daily. Take half tab (12.5mg) " at night for 1 week, then increase to 1 tab (25mg) at night, Disp: 30 tablet, Rfl: 2    ashwagandha root extract 500 mg capsule, Take 1 tablet by mouth once daily., Disp: , Rfl:     rimegepant (Nurtec ODT) 75 mg tablet,disintegrating, Take 1 tablet (75 mg) by mouth once daily as needed (for acute migraine, no more than 1 dose in 24 hours)., Disp: 16 tablet, Rfl: 11    rizatriptan (Maxalt) 10 mg tablet, Take 1 tablet (10 mg) by mouth 1 time if needed for migraine (May repeat in 2 hours if unresolved. Do not exceed 30 mg in 24 hours.) for up to 27 doses., Disp: 9 tablet, Rfl: 2    scopolamine (Transderm-Scop) 1 mg over 3 days patch 3 day, Place 1 patch on the skin every 3rd day., Disp: , Rfl:   Ashwaganda 650 mg daily      Past Medical History:   Diagnosis Date    Biliary dyskinesia 01/26/2023    Colicky RUQ abdominal pain 01/26/2023    Lymphadenitis 01/26/2023    Right upper quadrant pain 11/01/2022    Colicky RUQ abdominal pain    Suspected COVID-19 virus infection 01/26/2023    Thyromegaly 01/26/2023     Past Surgical History:   Procedure Laterality Date    OTHER SURGICAL HISTORY  04/13/2021    Kidney surgery    OTHER SURGICAL HISTORY  04/13/2021    Nephrectomy    OTHER SURGICAL HISTORY  04/13/2021    Tonsillectomy    OTHER SURGICAL HISTORY  05/24/2021    Cholecystectomy laparoscopic     Family History   Problem Relation Name Age of Onset    Diabetes Father Dad     Pancreatic cancer Other Unspecified Grandfather      Social History     Tobacco Use    Smoking status: Never     Passive exposure: Never    Smokeless tobacco: Never   Substance Use Topics    Alcohol use: Never     Social History     Substance and Sexual Activity   Drug Use Never        ROS  Constitutional: Denies fever, fatigue, malaise, and weight loss.  Head & Neck: Negative for visual or hearing changes. Denies sore throat, cough, congestion, or nasal drainage.    Cardiovascular: Denies chest discomfort, palpitations, or dizziness. Borderline  hypertension. Palpitations in the past (related to anxiety).  Respiratory: Negative for shortness of breath or cough.  GI: Negative for abdominal pain, N/V, diarrhea, hematochezia, constipation, or hepatic disease. Cholecystectomy.   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 Denies urinary/ bladder complaints.   Heme: Denies anemia or other blood disorders.  Allergy/immunology: Denies immunologic disease/ infectious disease processes.   Endocrine: Negative for endocrine disorders. Borderline thyromegaly. Thyroiditis after pregnancy. Regular bloodwork.   Musculoskeletal: Denies musculoskeletal problems.  Neurologic: As discussed. No history of seizures, TIA, CVA.   Psychiatric: Endorses occasional anxiety. Denies depression.  Dermatologic: Negative for skin conditions.    Objective   General Appearance: Lily is well-developed, well-nourished, 33 y.o. year old female, in no acute distress. Makes good eye contact, is alert, interactive, and cooperative. Demonstrates recent & remote memory recall. Subjective information consistent with objective assessment.     Vitals:    09/09/24 1515   BP: 153/87   Pulse: 97   Temp: 36.7 °C (98 °F)   PainSc: 0-No pain       Lab Results   Component Value Date    WBC 6.0 08/31/2019    RBC 4.01 08/31/2019    HGB 12.6 11/13/2023    HCT 38.0 11/13/2023     11/13/2023     11/13/2023    K 4.1 11/13/2023     08/31/2019    BUN 10 11/13/2023    CREATININE 0.90 11/13/2023    CALCIUM 8.9 08/31/2019    ALKPHOS 56 11/13/2023    AST 15 11/13/2023    ALT 20 11/13/2023    CHOL 138 11/13/2023    HDL 41.0 11/13/2023    TRIG 117 11/13/2023    TSH 2.25 11/13/2023      Mental Status    Patient Health Questionnaire-2 Score: 0   Denies daily anxiety, but does endorse episodes of anxiety, well-controlled.     Eye Exam  OPHTHALMOSCOPIC:   The ophthalmoscopic exam was normal. The fundi were well visualized with normal disc margins, clear vessels and vascular pulsations.  No disc edema. The cup/disk ratio was not enlarged. No hemorrhages or exudates were present in the posterior segments that were visualized.       Neurological Exam  CRANIAL NERVES:   CN 2: Visual fields full to confrontation.   CN 3, 4, 6: Pupils round, 4 mm in diameter, equally reactive to light. Eyelids symmetric; no ptosis. EOMs normal alignment, full range with normal saccades, pursuit, & convergence. No noted nystagmus.   CN 5: Facial sensation intact bilaterally.   CN 7: Normal and symmetric facial strength. Nasolabial folds symmetric.   CN 8: Hearing intact to conversation and finger rub.  CN 9, 10: Palate elevates symmetrically.  CN 11: Normal strength of shoulder shrug and neck turning.   CN 12: Tongue midline, with normal bulk and strength; no fasciculations.     Records Review  *Awaiting updated labs (2023) from [facility]  *Awaiting Diagnostic Imaging reports from [facility]   2/15/24?  CT of the head w/o- reported to be normal per pt.  MRI brain w/wo reported to be normal per pt.  MRA w/wo reported to be normal per pt.  MRA w/wo reported to be normal per pt.      Assessment & Plan  Chronic daily headache  Symptoms overlap with chronic migraine without aura.   Orders:    amitriptyline (Elavil) 25 mg tablet; Take 1 tablet (25 mg) by mouth once daily. Take half tab (12.5mg) at night for 1 week, then increase to 1 tab (25mg) at night    Hemiplegic migraine without status migrainosus, not intractable    Orders:    rizatriptan (Maxalt) 10 mg tablet; Take 1 tablet (10 mg) by mouth 1 time if needed for migraine (May repeat in 2 hours if unresolved. Do not exceed 30 mg in 24 hours.) for up to 27 doses.    rimegepant (Nurtec ODT) 75 mg tablet,disintegrating; Take 1 tablet (75 mg) by mouth once daily as needed (for acute migraine, no more than 1 dose in 24 hours).    amitriptyline (Elavil) 25 mg tablet; Take 1 tablet (25 mg) by mouth once daily. Take half tab (12.5mg) at night for 1 week, then  increase to 1 tab (25mg) at night    Chronic migraine without aura without status migrainosus, not intractable    Orders:    rizatriptan (Maxalt) 10 mg tablet; Take 1 tablet (10 mg) by mouth 1 time if needed for migraine (May repeat in 2 hours if unresolved. Do not exceed 30 mg in 24 hours.) for up to 27 doses.    amitriptyline (Elavil) 25 mg tablet; Take 1 tablet (25 mg) by mouth once daily. Take half tab (12.5mg) at night for 1 week, then increase to 1 tab (25mg) at night

## 2024-09-09 ENCOUNTER — APPOINTMENT (OUTPATIENT)
Dept: NEUROLOGY | Facility: CLINIC | Age: 34
End: 2024-09-09
Payer: COMMERCIAL

## 2024-09-09 VITALS — TEMPERATURE: 98 F | SYSTOLIC BLOOD PRESSURE: 153 MMHG | DIASTOLIC BLOOD PRESSURE: 87 MMHG | HEART RATE: 97 BPM

## 2024-09-09 DIAGNOSIS — R51.9 CHRONIC DAILY HEADACHE: Primary | ICD-10-CM

## 2024-09-09 DIAGNOSIS — G43.409 HEMIPLEGIC MIGRAINE WITHOUT STATUS MIGRAINOSUS, NOT INTRACTABLE: ICD-10-CM

## 2024-09-09 DIAGNOSIS — G43.709 CHRONIC MIGRAINE WITHOUT AURA WITHOUT STATUS MIGRAINOSUS, NOT INTRACTABLE: ICD-10-CM

## 2024-09-09 PROCEDURE — 99205 OFFICE O/P NEW HI 60 MIN: CPT

## 2024-09-09 RX ORDER — AMITRIPTYLINE HYDROCHLORIDE 25 MG/1
25 TABLET, FILM COATED ORAL DAILY
Qty: 30 TABLET | Refills: 2 | Status: SHIPPED | OUTPATIENT
Start: 2024-09-09 | End: 2025-09-09

## 2024-09-09 RX ORDER — RIZATRIPTAN BENZOATE 10 MG/1
10 TABLET ORAL ONCE AS NEEDED
Qty: 9 TABLET | Refills: 2 | Status: SHIPPED | OUTPATIENT
Start: 2024-09-09

## 2024-09-09 ASSESSMENT — PATIENT HEALTH QUESTIONNAIRE - PHQ9
1. LITTLE INTEREST OR PLEASURE IN DOING THINGS: NOT AT ALL
SUM OF ALL RESPONSES TO PHQ9 QUESTIONS 1 AND 2: 0
2. FEELING DOWN, DEPRESSED OR HOPELESS: NOT AT ALL

## 2024-09-09 ASSESSMENT — PAIN SCALES - GENERAL: PAINLEVEL: 0-NO PAIN

## 2024-09-09 NOTE — PATIENT INSTRUCTIONS
Abilio Bautista,    Thank you for coming to [facility]. It was a pleasure caring for you today.  The best way to contact me for medication refills or questions about your care is through [facility].  Otherwise, you can contact the office by phone: [phone].    Charito Bryant, APRN-CNP      Migraines:  Suggestions for preventing or controlling migraines:  Riboflavin (vitamin B2) 200 mg twice a day may be helpful. Side effects can include diarrhea, increased urination and bright yellow urine. This should not be taken by kids.   CoQ10 100 mg daily up to three times per day may also be helpful. Side effects can include nausea, diarrhea, and dyspepsia.   Magnesium (oxelate) 400- 600 mg daily for prevention. Magnesium can also help with menstrual migraines. Side effects can include diarrhea and flushing.   According to the American Academy of Neurology, there is not sufficient evidence that melatonin helps prevent or treat migraines, so it is not currently recommended for this use. Butterbur is also not currently recommended for migraine prevention as it can cause liver toxicity.   Avoid triggers that can cause or worsen migraines (food, lack of sleep, stress, etc.)  Headache frequency is noted to be increased in those with low physical activity, poor sleep, high BMI, smoking, and caffeine overuse. Managing stress with relaxation techniques and getting 20-30 minutes of aerobic exercise at least 4 times per week can help decrease headache frequency and intensity.   Keep a diary of your headaches to note triggers, how often you get them, how long they last, associated symptoms, what medicines you took and if they helped, and any other helpful information   Avoid taking rescue medication (NOT preventative medication) more than 3 days a week (includes both prescription and over the counter meds)  Take your preventative medication as directed. Let me know if you have side effects or problems with the  "medication. Do not suddenly stop the medication.     CGRP (calcitonin gene-related peptide) is a molecule in your brain that appears to be related to neurogenic inflammation and pain transmission in people who suffer from migraines.  There are 2 oral anti-CGRP medications available for acute migraine treatment: Nurtec and Ubrelvy. These are small molecule CRGP antagonists.         Nurtec is taken orally or disintegrated in mouth, 75 mg per dose, no more than one dose in 24 hours. Side effects are rare but may cause nausea.         You can visit [email or web address] to sign up for a Nurtec ODT Copay Card. This can be downloaded to your phone, or printed. Please then contact your pharmacy to give them your savings card information and have it applied to your prescription.   If your prescription was sent to  Specialty Pharmacy, you can contact them at [phone] to have the savings card applied to your prescription.    *Patients are not eligible to use this card if they are enrolled in a state or federally funded insurance program, including but not limited to Medicare, Medicaid, ,  Affairs health care, a state prescription drug assistance program, or the Government Health Insurance Plan available in [place]formerly known as [place]   "

## 2024-09-10 RX ORDER — PARSLEY 450 MG
1 CAPSULE ORAL DAILY
COMMUNITY

## 2024-09-19 ENCOUNTER — TELEMEDICINE (OUTPATIENT)
Dept: PRIMARY CARE | Facility: CLINIC | Age: 34
End: 2024-09-19
Payer: COMMERCIAL

## 2024-09-19 DIAGNOSIS — J01.90 ACUTE BACTERIAL SINUSITIS: Primary | ICD-10-CM

## 2024-09-19 DIAGNOSIS — B96.89 ACUTE BACTERIAL SINUSITIS: Primary | ICD-10-CM

## 2024-09-19 PROCEDURE — 99213 OFFICE O/P EST LOW 20 MIN: CPT | Performed by: NURSE PRACTITIONER

## 2024-09-19 RX ORDER — DOXYCYCLINE 100 MG/1
100 CAPSULE ORAL 2 TIMES DAILY
Qty: 28 CAPSULE | Refills: 0 | Status: SHIPPED | OUTPATIENT
Start: 2024-09-19 | End: 2024-10-03

## 2024-09-19 ASSESSMENT — ENCOUNTER SYMPTOMS
DIZZINESS: 0
FEVER: 0
CHILLS: 0
SORE THROAT: 1
SHORTNESS OF BREATH: 0
WHEEZING: 0
PALPITATIONS: 0
SINUS PRESSURE: 1
HEADACHES: 0
FATIGUE: 0
COUGH: 0
SINUS PAIN: 1

## 2024-09-19 ASSESSMENT — LIFESTYLE VARIABLES: HISTORY_OF_SMOKING: I HAVE NEVER SMOKED

## 2024-09-20 ENCOUNTER — TELEPHONE (OUTPATIENT)
Dept: PRIMARY CARE | Facility: CLINIC | Age: 34
End: 2024-09-20
Payer: COMMERCIAL

## 2024-09-20 DIAGNOSIS — B96.89 ACUTE BACTERIAL SINUSITIS: Primary | ICD-10-CM

## 2024-09-20 DIAGNOSIS — J01.90 ACUTE BACTERIAL SINUSITIS: Primary | ICD-10-CM

## 2024-09-20 RX ORDER — AZITHROMYCIN 250 MG/1
TABLET, FILM COATED ORAL
Qty: 6 TABLET | Refills: 0 | Status: SHIPPED | OUTPATIENT
Start: 2024-09-20 | End: 2024-09-25

## 2024-09-20 NOTE — TELEPHONE ENCOUNTER
Patient called in stating she had a VV yesterday with a CNP for a sinus infection. She stated she had requested a zpack but the CNP refused to prescribe this. Patient stated doxycycline was prescribed instead but this medication makes her extremely nauseous. She is requesting a zpack be sent in by Dr. Bill RODRIGUEZ in [address]  Please Advise

## 2024-09-20 NOTE — ASSESSMENT & PLAN NOTE
Start doxycycline 100 mg twice daily for 10 days  Over/benefits/side effects discussed  Follow-up with PCP if no improvement  Can take over-the-counter Tylenol 650 mg 3 times a day for ear pain

## 2024-09-20 NOTE — PROGRESS NOTES
Subjective   Patient ID: Lily Hernandez is a 33 y.o. female who presents for Earache and sinus pressure.    Right ear and fullness, sinus pressure, sneezing   Has taken antihistamine and mucinex  Denies fever, cough, congestion   Slight sore throat.   Symptoms present for 10 days   Has hoarseness  Hx of sinus infections  PCN listed on allergy list     .Virtual or Telephone Consent    An interactive audio and video telecommunication system which permits real time communications between the patient (at the originating site) and provider (at the distant site) was utilized to provide this telehealth service.   Verbal consent was requested and obtained from Lily Hernandez on this date, 09/19/24 for a telehealth visit.    Verified patient is located in [facility]   Constitutional:  Negative for chills, fatigue and fever.   HENT:  Positive for congestion, ear pain, sinus pressure, sinus pain, sneezing and sore throat.    Respiratory:  Negative for cough, shortness of breath and wheezing.    Cardiovascular:  Negative for chest pain and palpitations.   Neurological:  Negative for dizziness and headaches.       Objective   LMP  (LMP Unknown)     Physical Exam    Assessment/Plan   Problem List Items Addressed This Visit             ICD-10-CM    Acute bacterial sinusitis - Primary J01.90, B96.89     Start doxycycline 100 mg twice daily for 10 days  Over/benefits/side effects discussed  Follow-up with PCP if no improvement  Can take over-the-counter Tylenol 650 mg 3 times a day for ear pain           Relevant Medications    doxycycline (Vibramycin) 100 mg capsule

## 2024-10-09 ENCOUNTER — HOSPITAL ENCOUNTER (OUTPATIENT)
Dept: RADIOLOGY | Facility: CLINIC | Age: 34
Discharge: HOME | End: 2024-10-09
Payer: COMMERCIAL

## 2024-10-09 ENCOUNTER — OFFICE VISIT (OUTPATIENT)
Dept: ORTHOPEDIC SURGERY | Facility: CLINIC | Age: 34
End: 2024-10-09
Payer: COMMERCIAL

## 2024-10-09 DIAGNOSIS — M25.521 RIGHT ELBOW PAIN: ICD-10-CM

## 2024-10-09 DIAGNOSIS — S46.211A BICEPS STRAIN, RIGHT, INITIAL ENCOUNTER: ICD-10-CM

## 2024-10-09 DIAGNOSIS — S56.911A FOREARM STRAIN, RIGHT, INITIAL ENCOUNTER: ICD-10-CM

## 2024-10-09 PROCEDURE — L3670 SO ACRO/CLAV CAN WEB PRE OTS: HCPCS | Performed by: STUDENT IN AN ORGANIZED HEALTH CARE EDUCATION/TRAINING PROGRAM

## 2024-10-09 PROCEDURE — 73080 X-RAY EXAM OF ELBOW: CPT | Mod: RIGHT SIDE | Performed by: STUDENT IN AN ORGANIZED HEALTH CARE EDUCATION/TRAINING PROGRAM

## 2024-10-09 PROCEDURE — 99214 OFFICE O/P EST MOD 30 MIN: CPT | Performed by: STUDENT IN AN ORGANIZED HEALTH CARE EDUCATION/TRAINING PROGRAM

## 2024-10-09 PROCEDURE — 99203 OFFICE O/P NEW LOW 30 MIN: CPT | Performed by: STUDENT IN AN ORGANIZED HEALTH CARE EDUCATION/TRAINING PROGRAM

## 2024-10-09 PROCEDURE — 73080 X-RAY EXAM OF ELBOW: CPT | Mod: RT

## 2024-10-09 NOTE — LETTER
October 9, 2024     Patient: Lily Hernandez   YOB: 1990   Date of Visit: 10/9/2024       To Whom It May Concern:    It is my medical opinion that Lily Hernandez may return to work on Thursday, 10/10/24, with No Lifting, Pushing, Pulling greater than 10 Lbs until Follow up.  .    If you have any questions or concerns, please don't hesitate to call.         Sincerely,        Inocencio Krishnamurthy DO    CC: No Recipients

## 2024-10-09 NOTE — PROGRESS NOTES
Acute Injury New Patient Visit    HPI: Lily is a 33 y.o.female who presents today with new complaints of right elbow injury.  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  She denies any swelling and bruising.  The pain is primarily anterior and is worse with certain movements such as flexion.  She also notices this with supination.  She noticed some pain when she was handling a gallon of milk yesterday.  She has been trying rest, ice, compression, and elevation.  She does not like to take NSAIDs as she has a solitary kidney.    Plan: For this right forearm strain and right bicep strain, we will provide her with a sling for a few days to settle things down, I would advise her to take 3 to 5 days of ibuprofen to settle down the inflammation, provide her a note for work to limit any weightbearing activities to less than 10 pounds of the right elbow, continue other conservative treat measures as discussed, and follow-up in 2 weeks.    Assessment:   Problem List Items Addressed This Visit    None  Visit Diagnoses       Right elbow pain        Relevant Orders    XR elbow right 3+ views    Forearm strain, right, initial encounter        Relevant Orders    Sling    Biceps strain, right, initial encounter        Relevant Orders    Sling            Diagnostics: Reviewed all relevant imaging including x-ray, MRI, CT, and US.      Procedure:  Procedures    Physical Exam:  GENERAL:  No obvious acute distress.  NEURO:  Distally neurovascularly intact.  Sensation intact to light touch.  Extremity: Exam of the right elbow:  Skin is intact with no signs of infection;  No swelling or bruising;  No erythema or warmth;  TENDER overlying the anterior forearm muscles as well as the distal biceps muscle and has pain with resisted pronation, supination, and flexion.  No tenderness overlying the lateral epicondyle;  No tenderness overlying the  medial epicondyle;  No pain with resisted extension at the wrist;  No pain with supination;  No pain with flexion;  Negative hook test;    Orders Placed This Encounter    Sling    XR elbow right 3+ views      At the conclusion of the visit there were no further questions by the patient/family regarding their plan of care.  Patient was instructed to call or return with any issues, questions, or concerns regarding their injury and/or treatment plan described above.     10/09/24 at 5:18 PM - Inocencio Krishnamurthy,     Office: [phone]    This note was prepared using voice recognition software.  The details of this note are correct and have been reviewed, and corrected to the best of my ability.  Some grammatical errors may persist related to the Dragon software.

## 2024-10-09 NOTE — Clinical Note
October 9, 2024     Patient: Lily Hernandez   YOB: 1990   Date of Visit: 10/9/2024       To Whom It May Concern:    It is my medical opinion that Lily Hernandez {Work release (duty restriction):76326}.    If you have any questions or concerns, please don't hesitate to call.         Sincerely,        Inocencio Krishnamurthy,     CC: No Recipients

## 2024-10-24 ENCOUNTER — APPOINTMENT (OUTPATIENT)
Dept: ORTHOPEDIC SURGERY | Facility: CLINIC | Age: 34
End: 2024-10-24
Payer: COMMERCIAL

## 2024-10-24 LAB
ALANINE AMINOTRANSFERASE (SGPT) (U/L) IN SER/PLAS: 38 U/L
ALKALINE PHOSPHATASE (U/L) IN SER/PLAS: 65 U/L
ASPARTATE AMINOTRANSFERASE (SGOT) (U/L) IN SER/PLAS: 26 U/L
BILIRUBIN, TOTAL  (MG/DL) IN SER/PLAS: 0.5 MG/DL (ref 0–1.2)
CHOLESTEROL (MG/DL) IN SER/PLAS: 130 MG/DL
COBALAMIN (VITAMIN B12) (PG/ML) IN SER/PLAS EXTERNAL: 1997 PG/ML
CREATININE (MG/DL) IN SER/PLAS: 0.9 MG/DL
GLUCOSE: 89 MG/DL
HDL-CHOLESTEROL (MG/DL) IN SER/PLAS: 36 MG/DL
HEMOGLOBIN A1C/HEMOGLOBIN TOTAL IN BLOOD: 4.8 %
LDL CHOLESTEROL: 72 MG/DL
NON-UH HIE A/G RATIO: 1.1
NON-UH HIE ALB: 3.3 G/DL (ref 3.4–5)
NON-UH HIE ALK PHOS: 65 UNIT/L (ref 45–117)
NON-UH HIE BASO COUNT: 0.02 X1000 (ref 0–0.2)
NON-UH HIE BASOS %: 0.4 %
NON-UH HIE BILIRUBIN, TOTAL: 0.5 MG/DL (ref 0.3–1.2)
NON-UH HIE BUN/CREAT RATIO: 10
NON-UH HIE BUN: 9 MG/DL (ref 9–23)
NON-UH HIE CALCIUM: 9.2 MG/DL (ref 8.7–10.4)
NON-UH HIE CALCULATED LDL CHOLESTEROL: 72 MG/DL (ref 60–130)
NON-UH HIE CALCULATED OSMOLALITY: 281 MOSM/KG (ref 275–295)
NON-UH HIE CHLORIDE: 108 MMOL/L (ref 98–107)
NON-UH HIE CHOLESTEROL: 130 MG/DL (ref 100–200)
NON-UH HIE CO2, VENOUS: 29 MMOL/L (ref 20–31)
NON-UH HIE CREATININE: 0.9 MG/DL (ref 0.5–0.8)
NON-UH HIE DIFF?: NO
NON-UH HIE EOS COUNT: 0.12 X1000 (ref 0–0.5)
NON-UH HIE EOSIN %: 3.1 %
NON-UH HIE FERRITIN: 16 NG/ML (ref 10–291)
NON-UH HIE FOLATE: 16.4 NG/ML (ref 5.4–17.5)
NON-UH HIE GFR AA: >60
NON-UH HIE GLOBULIN: 2.9 G/DL
NON-UH HIE GLOMERULAR FILTRATION RATE: >60 ML/MIN/1.73M?
NON-UH HIE GLUCOSE: 89 MG/DL (ref 74–106)
NON-UH HIE GOT: 26 UNIT/L (ref 15–37)
NON-UH HIE GPT: 38 UNIT/L (ref 10–49)
NON-UH HIE HCT: 37.9 % (ref 36–46)
NON-UH HIE HDL CHOLESTEROL: 36 MG/DL (ref 40–60)
NON-UH HIE HGB A1C: 4.8 %
NON-UH HIE HGB: 12.8 G/DL (ref 12–16)
NON-UH HIE INSTR WBC: 3.8
NON-UH HIE K: 3.8 MMOL/L (ref 3.5–5.1)
NON-UH HIE LYMPH %: 30.2 %
NON-UH HIE LYMPH COUNT: 1.16 X1000 (ref 1.2–4.8)
NON-UH HIE MCH: 29.7 PG (ref 27–34)
NON-UH HIE MCHC: 33.9 G/DL (ref 32–37)
NON-UH HIE MCV: 87.7 FL (ref 80–100)
NON-UH HIE MONO %: 10.6 %
NON-UH HIE MONO COUNT: 0.41 X1000 (ref 0.1–1)
NON-UH HIE MPV: 8.2 FL (ref 7.4–10.4)
NON-UH HIE NA: 142 MMOL/L (ref 135–145)
NON-UH HIE NEUTROPHIL %: 55.6 %
NON-UH HIE NEUTROPHIL COUNT (ANC): 2.14 X1000 (ref 1.4–8.8)
NON-UH HIE NUCLEATED RBC: 0 /100WBC
NON-UH HIE PLATELET: 211 X10 (ref 150–450)
NON-UH HIE RBC: 4.32 X10 (ref 4.2–5.4)
NON-UH HIE RDW: 13.2 % (ref 11.5–14.5)
NON-UH HIE TOTAL CHOL/HDL CHOL RATIO: 3.6
NON-UH HIE TOTAL PROTEIN: 6.2 G/DL (ref 5.7–8.2)
NON-UH HIE TRIGLYCERIDES: 111 MG/DL (ref 30–150)
NON-UH HIE TSH: 2.68 UIU/ML (ref 0.55–4.78)
NON-UH HIE VIT D 25: 27 NG/ML
NON-UH HIE VITAMIN B12: 1997 PG/ML (ref 211–911)
NON-UH HIE WBC: 3.8 X10 (ref 4.5–11)
POTASSIUM (MMOL/L) IN SER/PLAS: 3.8 MMOL/L
SODIUM (MMOL/L) IN SER/PLAS: 142 MMOL/L
THYROID STIMULATING HORMONE (MIU/L) IN SER/PLAS: 2.68 MIU/L
TRIGLYCERIDES  (MG/DL) IN SER/PLAS: 111 MG/DL
UREA NITROGEN (MG/DL) IN SER/PLAS: 9 MG/DL

## 2024-10-25 ENCOUNTER — TELEPHONE (OUTPATIENT)
Dept: PRIMARY CARE | Facility: CLINIC | Age: 34
End: 2024-10-25
Payer: COMMERCIAL

## 2024-10-25 DIAGNOSIS — D72.819 LEUKOPENIA, UNSPECIFIED TYPE: Primary | ICD-10-CM

## 2024-11-06 ENCOUNTER — APPOINTMENT (OUTPATIENT)
Dept: NEUROLOGY | Facility: CLINIC | Age: 34
End: 2024-11-06
Payer: COMMERCIAL

## 2024-11-11 ENCOUNTER — APPOINTMENT (OUTPATIENT)
Dept: NEUROLOGY | Facility: CLINIC | Age: 34
End: 2024-11-11
Payer: COMMERCIAL

## 2024-11-20 ENCOUNTER — APPOINTMENT (OUTPATIENT)
Dept: NEUROLOGY | Facility: CLINIC | Age: 34
End: 2024-11-20
Payer: COMMERCIAL

## 2024-11-20 VITALS
RESPIRATION RATE: 16 BRPM | HEART RATE: 61 BPM | TEMPERATURE: 97.8 F | DIASTOLIC BLOOD PRESSURE: 71 MMHG | SYSTOLIC BLOOD PRESSURE: 117 MMHG

## 2024-11-20 DIAGNOSIS — G43.709 CHRONIC MIGRAINE WITHOUT AURA WITHOUT STATUS MIGRAINOSUS, NOT INTRACTABLE: Primary | ICD-10-CM

## 2024-11-20 PROCEDURE — 99213 OFFICE O/P EST LOW 20 MIN: CPT

## 2024-11-20 RX ORDER — ZAVEGEPANT 10 MG/.1ML
10 SPRAY NASAL DAILY PRN
Qty: 6 EACH | Refills: 11 | Status: SHIPPED | OUTPATIENT
Start: 2024-11-20

## 2024-11-20 NOTE — PATIENT INSTRUCTIONS
Dear Lily,    Thank you for coming to [facility]. It was a pleasure caring for you today.  The best way to contact me for medication refills or questions about your care is through [employer].  Otherwise, you can contact the office by phone: [phone].    Charito Bryant, KELSEY-CNP

## 2024-11-20 NOTE — PROGRESS NOTES
Chief Complaint   Patient presents with    Follow-up    Headache     Daily headaches have been better. Not as frequent. Not every day now. About 3-4 times a week. Not full migraines. Patient stopped her birth control and BP has been better and headaches have been better since.     Migraine     Nurtec is helping. Within an hour it kicks in and she is able to manage it.        Subjective   HPI  Lily Hernandez is a 34 y.o. year old female who presents for follow-up of chief complaint Chronic migraine without aura without status migrainosus, not intractable [G43.709]. 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     Previous hemiplegic migraines have been accompanied by stroke-like symptoms.   Lily is having  10 HA days per month, 4 of the HAs meet migraine criteria.   Dehydration is thought to be a trigger.   Aura vision changes occurs 30 minutes prior to headache.   Neck stiffness radiates forward. Associated photophobia/phonophobia.  The current rescue medications work within  1.5 hours and decreases the headache by 60%. Has not tried rizatriptan.  Caffeine: does consume daily caffiene  Sleep: 5 to 6 hours per night  Questioning ADHD. Endorses sister possibly having ADD, nephew ODD. Feels like she darts- advised to speak with PCP.  States that she recently saw nephrologist, started on Losartan   Stopped birth control, swelling decreased    Current/ past HA treatments:  Preventive:  Amitriptyline    Rescue:  Nurtec has been helpful for   Has not tried rizatriptan  Fioricet - was given in ED, works well for night time headaches    Current Outpatient Medications:     chlorthalidone (Hygroton) 25 mg tablet, Take 1 tablet (25 mg) by mouth once daily., Disp: 30 tablet, Rfl: 5    cyanocobalamin (Vitamin B-12) 50 mcg tablet, Take 1 tablet (50 mcg) by mouth once daily., Disp: , Rfl:      Lactobacillus acidophilus (PROBIOTIC ORAL), , Disp: , Rfl:     rimegepant (Nurtec ODT) 75 mg tablet,disintegrating, Take 1 tablet (75 mg) by mouth once daily as needed (for acute migraine, no more than 1 dose in 24 hours)., Disp: 16 tablet, Rfl: 11    scopolamine (Transderm-Scop) 1 mg over 3 days patch 3 day, Place 1 patch on the skin every 3rd day., Disp: , Rfl:     rizatriptan (Maxalt) 10 mg tablet, Take 1 tablet (10 mg) by mouth 1 time if needed for migraine (May repeat in 2 hours if unresolved. Do not exceed 30 mg in 24 hours.) for up to 27 doses. (Patient not taking: Reported on 11/20/2024), Disp: 9 tablet, Rfl: 2    Past Medical History:   Diagnosis Date    Biliary dyskinesia 01/26/2023    Colicky RUQ abdominal pain 01/26/2023    Lymphadenitis 01/26/2023    Right upper quadrant pain 11/01/2022    Colicky RUQ abdominal pain    Suspected COVID-19 virus infection 01/26/2023    Thyromegaly 01/26/2023     Past Surgical History:   Procedure Laterality Date    OTHER SURGICAL HISTORY  04/13/2021    Kidney surgery    OTHER SURGICAL HISTORY  04/13/2021    Nephrectomy    OTHER SURGICAL HISTORY  04/13/2021    Tonsillectomy    OTHER SURGICAL HISTORY  05/24/2021    Cholecystectomy laparoscopic     Family History   Problem Relation Name Age of Onset    Diabetes Father Dad     Pancreatic cancer Other Unspecified Grandfather      Social History     Tobacco Use    Smoking status: Never     Passive exposure: Never    Smokeless tobacco: Never   Substance Use Topics    Alcohol use: Never     Social History     Substance and Sexual Activity   Drug Use Never      ROS  As noted in HPI, otherwise all other systems have been reviewed are negative for complaint.     Objective   General Appearance: Lily is well-developed, well-nourished, 34 y.o. year old female, in no acute distress. Makes good eye contact, is alert, interactive, and cooperative. Demonstrates recent & remote memory recall. Subjective information consistent with  objective assessment.     Vitals:    11/20/24 1537   BP: 117/71   Pulse: 61   Resp: 16   Temp: 36.6 °C (97.8 °F)   TempSrc: Temporal     Lab Results   Component Value Date    WBC 6.0 08/31/2019    RBC 4.01 08/31/2019    HGB 12.6 11/13/2023    HCT 38.0 11/13/2023     11/13/2023     10/24/2024    K 3.8 10/24/2024     08/31/2019    BUN 9 10/24/2024    CREATININE 0.90 10/24/2024    CALCIUM 8.9 08/31/2019    ALKPHOS 65 10/24/2024    AST 26 10/24/2024    ALT 38 10/24/2024    QCNAHZZK99 1,997 10/24/2024    HGBA1C 4.8 10/24/2024    CHOL 130 10/24/2024    HDL 36.0 10/24/2024    TRIG 111 10/24/2024    TSH 2.68 10/24/2024      Neurological Exam  Cranial Nerves  CN III, IV, VI: Extraocular movements intact bilaterally. Normal lids and orbits bilaterally.  CN VII: Full and symmetric facial movement.  CN VIII: Hearing is normal.  Assessment & Plan  Chronic migraine without aura without status migrainosus, not intractable    Orders:    zavegepant (Zavzpret) 10 mg/actuation spray,non-aerosol; Administer 10 mg into affected nostril(s) once daily as needed (migraine, no more than 1 device per 24 hours).     ASSESSMENT/PLAN:  Sample was given for Zavzpret  Start Zavzpret for alternate rescue med as alternate for Nurtec- not on the same day.  Follow-up 6 months    Charito Bryant, APRN-CNP

## 2024-12-14 ENCOUNTER — TELEMEDICINE (OUTPATIENT)
Dept: PRIMARY CARE | Facility: CLINIC | Age: 34
End: 2024-12-14
Payer: COMMERCIAL

## 2024-12-14 DIAGNOSIS — J01.00 ACUTE NON-RECURRENT MAXILLARY SINUSITIS: Primary | ICD-10-CM

## 2024-12-14 PROBLEM — B96.89 ACUTE BACTERIAL SINUSITIS: Status: RESOLVED | Noted: 2024-09-19 | Resolved: 2024-12-14

## 2024-12-14 PROBLEM — J01.90 ACUTE BACTERIAL SINUSITIS: Status: RESOLVED | Noted: 2024-09-19 | Resolved: 2024-12-14

## 2024-12-14 PROBLEM — H69.91 DYSFUNCTION OF RIGHT EUSTACHIAN TUBE: Status: RESOLVED | Noted: 2023-01-26 | Resolved: 2024-12-14

## 2024-12-14 PROBLEM — H93.8X9 SENSATION OF FULLNESS IN EAR: Status: RESOLVED | Noted: 2023-01-26 | Resolved: 2024-12-14

## 2024-12-14 PROCEDURE — 1036F TOBACCO NON-USER: CPT | Performed by: NURSE PRACTITIONER

## 2024-12-14 PROCEDURE — 99214 OFFICE O/P EST MOD 30 MIN: CPT | Performed by: NURSE PRACTITIONER

## 2024-12-14 RX ORDER — DOXYCYCLINE 100 MG/1
100 CAPSULE ORAL 2 TIMES DAILY
Qty: 10 CAPSULE | Refills: 0 | Status: SHIPPED | OUTPATIENT
Start: 2024-12-14 | End: 2024-12-19

## 2024-12-14 RX ORDER — LOSARTAN POTASSIUM 25 MG/1
12.5 TABLET ORAL DAILY
COMMUNITY

## 2024-12-14 NOTE — PROGRESS NOTES
Subjective   Patient ID: Lily Hernandez is a 34 y.o. female who presents for Illness.    Virtual or Telephone Consent    An interactive audio and video telecommunication system which permits real time communications between the patient (at the originating site) and provider (at the distant site) was utilized to provide this telehealth service.   Verbal consent was requested and obtained from Lily Hernandez on this date, 12/14/24 for a telehealth visit.   Patient is located in [place]    Sore throat, nasal congestions, cough, laryngitis and sneezing x 6 days.  Cough worsens in the evening  Denies fever, chills, shortness of breath, or chest pain  She has tried tylenol cold and sinus and mucinex  LMP - 2 months ago - irregular cycles           Illness         Review of Systems   All other systems reviewed and are negative.      Objective   There were no vitals taken for this visit.    Physical Exam    Assessment/Plan   Problem List Items Addressed This Visit             ICD-10-CM    Acute non-recurrent maxillary sinusitis - Primary J01.00     -doxycycline 100 mg BID x 5 dats  -  Nasal saline, increase fluids  -  hand hygiene and infection prevention discussed  -  Warm compress to sinuses  - humidification  - recommend to eat yogurt twice daily while taking antibiotic or a daily probiotic

## 2024-12-14 NOTE — ASSESSMENT & PLAN NOTE
-doxycycline 100 mg BID x 5 dats  -  Nasal saline, increase fluids  -  hand hygiene and infection prevention discussed  -  Warm compress to sinuses  - humidification  - recommend to eat yogurt twice daily while taking antibiotic or a daily probiotic    
DC instructions

## 2024-12-16 ENCOUNTER — TELEPHONE (OUTPATIENT)
Dept: NEUROLOGY | Facility: CLINIC | Age: 34
End: 2024-12-16
Payer: COMMERCIAL

## 2024-12-16 ENCOUNTER — TELEPHONE (OUTPATIENT)
Dept: PRIMARY CARE | Facility: CLINIC | Age: 34
End: 2024-12-16

## 2024-12-16 DIAGNOSIS — G43.409 HEMIPLEGIC MIGRAINE WITHOUT STATUS MIGRAINOSUS, NOT INTRACTABLE: ICD-10-CM

## 2024-12-16 DIAGNOSIS — R05.1 ACUTE COUGH: Primary | ICD-10-CM

## 2024-12-16 RX ORDER — BENZONATATE 200 MG/1
200 CAPSULE ORAL 3 TIMES DAILY PRN
Qty: 30 CAPSULE | Refills: 0 | Status: SHIPPED | OUTPATIENT
Start: 2024-12-16 | End: 2024-12-26

## 2024-12-16 NOTE — TELEPHONE ENCOUNTER
Called stating that her rimegepant (Nurtec ODT) 75 mg tablet,disintegrating  was discontuned.  This was in error by another Provider.  Please refill

## 2024-12-16 NOTE — TELEPHONE ENCOUNTER
Lily called in, she just had a virtual appointment yesterday and was prescribed antibiotics for sinus infection. She asked if Dr. Khan could also prescribe her something for her cough, as she is now also starting to lose her voice.   [facility] in [place] is preferred pharmacy. Please advise.

## 2025-03-08 ENCOUNTER — OFFICE VISIT (OUTPATIENT)
Dept: PRIMARY CARE | Facility: CLINIC | Age: 35
End: 2025-03-08
Payer: COMMERCIAL

## 2025-03-08 ENCOUNTER — APPOINTMENT (OUTPATIENT)
Dept: PRIMARY CARE | Facility: CLINIC | Age: 35
End: 2025-03-08
Payer: COMMERCIAL

## 2025-03-08 VITALS
HEART RATE: 81 BPM | HEIGHT: 67 IN | TEMPERATURE: 98.7 F | BODY MASS INDEX: 25.9 KG/M2 | WEIGHT: 165 LBS | DIASTOLIC BLOOD PRESSURE: 71 MMHG | OXYGEN SATURATION: 99 % | SYSTOLIC BLOOD PRESSURE: 104 MMHG | RESPIRATION RATE: 16 BRPM

## 2025-03-08 DIAGNOSIS — R30.0 BURNING WITH URINATION: ICD-10-CM

## 2025-03-08 DIAGNOSIS — R39.9 SYMPTOMS OF URINARY TRACT INFECTION: Primary | ICD-10-CM

## 2025-03-08 DIAGNOSIS — R39.15 URGENCY OF URINATION: ICD-10-CM

## 2025-03-08 LAB
POC APPEARANCE, URINE: ABNORMAL
POC BILIRUBIN, URINE: NEGATIVE
POC BLOOD, URINE: ABNORMAL
POC COLOR, URINE: YELLOW
POC GLUCOSE, URINE: NEGATIVE MG/DL
POC KETONES, URINE: NEGATIVE MG/DL
POC LEUKOCYTES, URINE: ABNORMAL
POC NITRITE,URINE: NEGATIVE
POC PH, URINE: 6.5 PH
POC PROTEIN, URINE: ABNORMAL MG/DL
POC SPECIFIC GRAVITY, URINE: 1.02
POC UROBILINOGEN, URINE: 0.2 EU/DL

## 2025-03-08 PROCEDURE — 99213 OFFICE O/P EST LOW 20 MIN: CPT | Performed by: NURSE PRACTITIONER

## 2025-03-08 PROCEDURE — 81003 URINALYSIS AUTO W/O SCOPE: CPT | Performed by: NURSE PRACTITIONER

## 2025-03-08 RX ORDER — SULFAMETHOXAZOLE AND TRIMETHOPRIM 800; 160 MG/1; MG/1
1 TABLET ORAL 2 TIMES DAILY
Qty: 14 TABLET | Refills: 0 | Status: SHIPPED | OUTPATIENT
Start: 2025-03-08 | End: 2025-03-15

## 2025-03-08 RX ORDER — SUMATRIPTAN SUCCINATE 100 MG/1
100 TABLET ORAL ONCE AS NEEDED
COMMUNITY
Start: 2024-03-26

## 2025-03-08 RX ORDER — LOSARTAN POTASSIUM 25 MG/1
12.5 TABLET ORAL
COMMUNITY
Start: 2024-11-12 | End: 2025-05-11

## 2025-03-08 RX ORDER — ERGOCALCIFEROL 1.25 MG/1
1 CAPSULE ORAL
COMMUNITY

## 2025-03-08 ASSESSMENT — ENCOUNTER SYMPTOMS
OCCASIONAL FEELINGS OF UNSTEADINESS: 0
DEPRESSION: 0
LOSS OF SENSATION IN FEET: 0

## 2025-03-08 ASSESSMENT — PATIENT HEALTH QUESTIONNAIRE - PHQ9
2. FEELING DOWN, DEPRESSED OR HOPELESS: NOT AT ALL
SUM OF ALL RESPONSES TO PHQ9 QUESTIONS 1 AND 2: 0
2. FEELING DOWN, DEPRESSED OR HOPELESS: NOT AT ALL
1. LITTLE INTEREST OR PLEASURE IN DOING THINGS: NOT AT ALL
SUM OF ALL RESPONSES TO PHQ9 QUESTIONS 1 AND 2: 0
1. LITTLE INTEREST OR PLEASURE IN DOING THINGS: NOT AT ALL

## 2025-03-08 NOTE — PROGRESS NOTES
"Subjective   Patient ID: Lily Hernandez is a 34 y.o. female who is with complaint of symptoms of UTI.    HPI   Patient is a 34 y.o. female who CONSULTED AT [facility] today. Patient is with complaints of burning sensation on urination, urgency of urination, sensation of inadequate emptying post voiding, lower abdominal discomfort (dysuria), bad odor of urine, and cloudy urine. She denies having any increased urinary frequency, nocturia, low back pain, flank pain, incontinence, blood in urine, nausea, vomiting, chills, nor fever. Patient states symptoms has been going on for 2-3 weeks. Symptoms waxing and waning but always there. Patient has taken tylenol for relief of symptoms.     Review of Systems    Objective   /71 Comment: auto  Pulse 81   Temp 37.1 °C (98.7 °F)   Resp 16   Ht 1.702 m (5' 7\")   Wt 74.8 kg (165 lb)   SpO2 99%   BMI 25.84 kg/m²     Physical Exam  General: no weight loss, generally healthy, no fatigue  Head:  no headaches / sinus pain, no vertigo, no injury  Eyes: no diplopia, no tearing, no pain,   Ears: no change in hearing, no tinnitus, no bleeding, no vertigo  Mouth:  no dental difficulties, no gingival bleeding, no sore throat, no loss of sense of taste  Nose: no congestion, no  discharge, no bleeding, no obstruction, no loss of sense of smell  Neck: no stiffness, no pain, no tenderness, no masses, no bruit  Pulmonary: no dyspnea, no wheezing, no hemoptysis, no cough  Cardiovascular: no chest pain, no palpitations, no syncope, no orthopnea  Gastrointestinal: no change in appetite, no dysphagia, no abdominal pains, no diarrhea, no emesis, no melena  Genito Urinary: (+) burning sensation on urination, (+) urgency of urination, (+) sensation of inadequate emptying post voiding, (+) lower abdominal discomfort (dysuria), (+) bad odor of urine, (+) cloudy urine, no increased urinary frequency, no nocturia, no low back pain, no flank pain, no incontinence, no blood " in urine,   Musculoskeletal: no muscle ache, no joint pain, no limitation of range of motion, no paresthesia, no numbness  Constitutional: no fever, no chills, no night sweats    Assessment/Plan   Problem List Items Addressed This Visit    None  Visit Diagnoses         Codes    Symptoms of urinary tract infection    -  Primary R39.9    Relevant Medications    sulfamethoxazole-trimethoprim (Bactrim DS) 800-160 mg tablet    Other Relevant Orders    POCT UA Automated manually resulted (Completed)    Urine Culture    Burning with urination     R30.0    Relevant Medications    sulfamethoxazole-trimethoprim (Bactrim DS) 800-160 mg tablet    Other Relevant Orders    POCT UA Automated manually resulted (Completed)    Urine Culture    Urgency of urination     R39.15    Relevant Medications    sulfamethoxazole-trimethoprim (Bactrim DS) 800-160 mg tablet    Other Relevant Orders    POCT UA Automated manually resulted (Completed)    Urine Culture        Urinalysis was done at the office today. Urinalysis result explained and discussed with patient. Urine sample submitted to laboratory for culture and sensitivity study. The laboratory examination requested were explained and discussed with patient.    DISCHARGE SUMMARY:   Patient seen and examined. Probable diagnosis, differential diagnosis, treatment, treatment options, and probable complications were discussed and explained to patient. she was to take medication/s associated with this visit. she may take over-the-counter pain and/or fever medication if needed. Advised increased oral fluid intake (2 liters of water or more per day). Reinforced to continue personal hygiene. Patient to return to clinic if there is worsening or persistence of symptoms. Patient verbalized understanding.    Patient to come back in 7 - 10 days if needed for worsening symptoms.

## 2025-03-10 LAB — BACTERIA UR CULT: ABNORMAL

## 2025-03-11 ENCOUNTER — DOCUMENTATION (OUTPATIENT)
Dept: PRIMARY CARE | Facility: CLINIC | Age: 35
End: 2025-03-11
Payer: COMMERCIAL

## 2025-03-11 NOTE — PROGRESS NOTES
Patient return called and I talked to patient. We discussed lab results. Patient states symptoms are getting better.

## 2025-03-14 ENCOUNTER — DOCUMENTATION (OUTPATIENT)
Dept: PRIMARY CARE | Facility: CLINIC | Age: 35
End: 2025-03-14
Payer: COMMERCIAL

## 2025-03-14 DIAGNOSIS — R30.0 BURNING WITH URINATION: ICD-10-CM

## 2025-03-14 DIAGNOSIS — R39.15 URGENCY OF URINATION: ICD-10-CM

## 2025-03-14 DIAGNOSIS — R39.9 SYMPTOMS OF URINARY TRACT INFECTION: ICD-10-CM

## 2025-03-14 RX ORDER — SULFAMETHOXAZOLE AND TRIMETHOPRIM 800; 160 MG/1; MG/1
1 TABLET ORAL 2 TIMES DAILY
Qty: 14 TABLET | Refills: 0 | Status: SHIPPED | OUTPATIENT
Start: 2025-03-14 | End: 2025-03-21

## 2025-03-14 NOTE — PROGRESS NOTES
Patient called and I talked to patient. We discussed symptoms. Patient states she is getting better. Symptoms are decreased but still present. She is now on her last day of antibiotics. I refilled antibiotics.

## 2025-04-09 LAB
NON-UH HIE ALB: 3.8 G/DL (ref 3.4–5)
NON-UH HIE ALB: 3.8 G/DL (ref 3.4–5)
NON-UH HIE APPEARANCE, U: NORMAL
NON-UH HIE BASO COUNT: 0.03 X1000 (ref 0–0.2)
NON-UH HIE BASOS %: 0.7 %
NON-UH HIE BILIRUBIN, U: NORMAL
NON-UH HIE BLOOD, U: NORMAL
NON-UH HIE BUN/CREAT RATIO: 14.4
NON-UH HIE BUN: 13 MG/DL (ref 9–23)
NON-UH HIE CALCIUM: 9.4 MG/DL (ref 8.7–10.4)
NON-UH HIE CALCULATED OSMOLALITY: 273 MOSM/KG (ref 275–295)
NON-UH HIE CHLORIDE: 102 MMOL/L (ref 98–107)
NON-UH HIE CO2, VENOUS: 30 MMOL/L (ref 20–31)
NON-UH HIE COLOR, U: NORMAL
NON-UH HIE CORRECTED CALCIUM: 9.6 MG/DL (ref 8.5–10.5)
NON-UH HIE CREATININE, URINE MG/DL: 56.2 MG/DL
NON-UH HIE CREATININE: 0.9 MG/DL (ref 0.5–0.8)
NON-UH HIE DIFF?: NORMAL
NON-UH HIE EOS COUNT: 0.07 X1000 (ref 0–0.5)
NON-UH HIE EOSIN %: 1.5 %
NON-UH HIE GFR AA: >60
NON-UH HIE GLOMERULAR FILTRATION RATE: >60 ML/MIN/1.73M?
NON-UH HIE GLUCOSE QUAL, U: NORMAL
NON-UH HIE GLUCOSE: 87 MG/DL (ref 74–106)
NON-UH HIE HCT: 38.8 % (ref 36–46)
NON-UH HIE HGB: 12.7 G/DL (ref 12–16)
NON-UH HIE I-PTH: 75 PG/ML (ref 18.4–80.1)
NON-UH HIE INSTR WBC: 4.6
NON-UH HIE K: 3.4 MMOL/L (ref 3.5–5.1)
NON-UH HIE KETONES, U: NORMAL
NON-UH HIE LEUKOCYTE ESTERASE, U: NORMAL
NON-UH HIE LYMPH %: 38.3 %
NON-UH HIE LYMPH COUNT: 1.78 X1000 (ref 1.2–4.8)
NON-UH HIE MAGNESIUM: 1.7 MG/DL (ref 1.6–2.6)
NON-UH HIE MCH: 27.9 PG (ref 27–34)
NON-UH HIE MCHC: 32.7 G/DL (ref 32–37)
NON-UH HIE MCV: 85.2 FL (ref 80–100)
NON-UH HIE MICROALBUMIN, URINE MG/L: 16 MG/L
NON-UH HIE MICROALBUMIN/CREATININE RATIO: 28 MG MALB/GM CREAT (ref 0–30)
NON-UH HIE MONO %: 13 %
NON-UH HIE MONO COUNT: 0.6 X1000 (ref 0.1–1)
NON-UH HIE MPV: 8.1 FL (ref 7.4–10.4)
NON-UH HIE NA: 137 MMOL/L (ref 135–145)
NON-UH HIE NEUTROPHIL %: 46.6 %
NON-UH HIE NEUTROPHIL COUNT (ANC): 2.16 X1000 (ref 1.4–8.8)
NON-UH HIE NITRITE, U: NORMAL
NON-UH HIE NUCLEATED RBC: 0 /100WBC
NON-UH HIE PH, U: 6 (ref 4.5–8)
NON-UH HIE PHOSPHORUS: 3 MG/DL (ref 2–5.1)
NON-UH HIE PLATELET: 267 X10 (ref 150–450)
NON-UH HIE PROTEIN, U: NORMAL
NON-UH HIE RBC: 4.55 X10 (ref 4.2–5.4)
NON-UH HIE RDW: 13.5 % (ref 11.5–14.5)
NON-UH HIE SPECIFIC GRAVITY, U: 1.01 (ref 1–1.03)
NON-UH HIE U MICRO: NORMAL
NON-UH HIE URIC ACID: 6 MG/DL (ref 3.1–7.8)
NON-UH HIE UROBILINOGEN QUAL, U: NORMAL
NON-UH HIE VIT D 25: 69 NG/ML
NON-UH HIE WBC: 4.6 X10 (ref 4.5–11)

## 2025-05-21 ENCOUNTER — APPOINTMENT (OUTPATIENT)
Dept: NEUROLOGY | Facility: CLINIC | Age: 35
End: 2025-05-21
Payer: COMMERCIAL

## 2025-06-04 NOTE — PROGRESS NOTES
Chief Complaint   Patient presents with    Migraine     Follow up     Subjective   HPI  Lily Hernandez is a 34 y.o. year old female who presents for follow-up of chief complaint Hemiplegic migraine without status migrainosus, not intractable [G43.409]. 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     Previous hemiplegic migraines have been accompanied by stroke-like symptoms. (Has not had in 2 years).  Lily is currently experiencing up to 4 migraine days per month, no longer experiencing daily HAs.    Dehydration is thought to be a trigger. Usually, no aura prior to migraines.   Aura vision changes occurs 30 minutes prior to hemiplegic migraine.   Neck stiffness radiates forward. Associated photophobia/phonophobia.  The current rescue medications work within  1.5 hours and decreases the headache by 60%. Has not tried rizatriptan.  Caffeine: does consume daily caffiene  Sleep: 5 to 6 hours per night  Questioning ADHD. Endorses sister possibly having ADD, nephew ODD. Feels like she darts- advised to speak with PCP.    Current/ past HA treatments:  Preventive:  Amitriptyline    Rescue:  Nurtec has been helpful for   Zavzpret- helpful, but not well- not covered by insurance.   Has not tried rizatriptan  Fioricet - was given in ED, works well for night time headaches    Current Outpatient Medications:     cyanocobalamin (Vitamin B-12) 50 mcg tablet, Take 1 tablet (50 mcg) by mouth once daily., Disp: , Rfl:     Lactobacillus acidophilus (PROBIOTIC ORAL), , Disp: , Rfl:     olmesartan (BENIcar) 5 mg tablet, Take 1 tablet (5 mg) by mouth once daily., Disp: , Rfl:     scopolamine (Transderm-Scop) 1 mg over 3 days patch 3 day, Place 1 patch on the skin every 3rd day., Disp: , Rfl:     chlorthalidone (Hygroton) 25 mg tablet, Take 1 tablet (25 mg) by mouth once daily., Disp: 30 tablet, Rfl: 5    " losartan (Cozaar) 25 mg tablet, Take 0.5 tablets (12.5 mg) by mouth once daily. (Patient not taking: Reported on 3/8/2025), Disp: , Rfl:     losartan (Cozaar) 25 mg tablet, Take 0.5 tablets (12.5 mg) by mouth once daily., Disp: , Rfl:     rimegepant (Nurtec ODT) 75 mg tablet,disintegrating, Dissolve 1 tablet (75 mg) in the mouth once daily as needed (for acute migraine, no more than 1 dose in 24 hours)., Disp: 16 tablet, Rfl: 11    SUMAtriptan (Imitrex) 100 mg tablet, 1 tablet (100 mg) 1 time if needed. (Patient not taking: Reported on 6/5/2025), Disp: , Rfl:     Vitamin D2 1,250 mcg (50,000 unit) capsule, Take 1 capsule (1,250 mcg) by mouth 1 (one) time per week., Disp: , Rfl:     zavegepant (Zavzpret) 10 mg/actuation spray,non-aerosol, Administer 10 mg into affected nostril(s) once daily as needed (migraine, no more than 1 device per 24 hours)., Disp: 6 each, Rfl: 11    Social History     Tobacco Use    Smoking status: Never     Passive exposure: Never    Smokeless tobacco: Never   Substance Use Topics    Alcohol use: Never     Social History     Substance and Sexual Activity   Drug Use Never      ROS  As noted in HPI, otherwise all other systems have been reviewed are negative for complaint.     Objective   General Appearance: Lily is well-developed, well-nourished, 34 y.o. year old female, in no acute distress. Makes good eye contact, is alert, interactive, and cooperative. Demonstrates recent & remote memory recall. Subjective information consistent with objective assessment.     Vitals:    06/05/25 1459   BP: 122/82   Pulse: 82   Resp: 17   Temp: 35.8 °C (96.5 °F)   TempSrc: Temporal   Weight: 72.6 kg (160 lb)   Height: 1.702 m (5' 7\")   PainSc: 0-No pain       Lab Results   Component Value Date    WBC 6.0 08/31/2019    RBC 4.01 08/31/2019    HGB 12.6 11/13/2023    HCT 38.0 11/13/2023     11/13/2023     10/24/2024    K 3.8 10/24/2024     08/31/2019    BUN 9 10/24/2024    CREATININE 0.90 " 10/24/2024    CALCIUM 8.9 08/31/2019    ALKPHOS 65 10/24/2024    AST 26 10/24/2024    ALT 38 10/24/2024    [id number] 1,997 10/24/2024    HGBA1C 4.8 10/24/2024    CHOL 130 10/24/2024    HDL 36.0 10/24/2024    TRIG 111 10/24/2024    TSH 2.68 10/24/2024      Neurological Exam  Cranial Nerves  CN III, IV, VI: Extraocular movements intact bilaterally. Normal lids and orbits bilaterally.  CN VII: Full and symmetric facial movement.  CN VIII: Hearing is normal.  Assessment & Plan  Chronic migraine without aura without status migrainosus, not intractable    Orders:    rimegepant (Nurtec ODT) 75 mg tablet,disintegrating; Dissolve 1 tablet (75 mg) in the mouth once daily as needed (for acute migraine, no more than 1 dose in 24 hours).    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       Charito Bryant, APRN-CNP

## 2025-06-05 ENCOUNTER — APPOINTMENT (OUTPATIENT)
Dept: NEUROLOGY | Facility: CLINIC | Age: 35
End: 2025-06-05
Payer: COMMERCIAL

## 2025-06-05 VITALS
RESPIRATION RATE: 17 BRPM | HEIGHT: 67 IN | WEIGHT: 160 LBS | HEART RATE: 82 BPM | TEMPERATURE: 96.5 F | BODY MASS INDEX: 25.11 KG/M2 | SYSTOLIC BLOOD PRESSURE: 122 MMHG | DIASTOLIC BLOOD PRESSURE: 82 MMHG

## 2025-06-05 DIAGNOSIS — G43.409 HEMIPLEGIC MIGRAINE WITHOUT STATUS MIGRAINOSUS, NOT INTRACTABLE: ICD-10-CM

## 2025-06-05 DIAGNOSIS — G43.709 CHRONIC MIGRAINE WITHOUT AURA WITHOUT STATUS MIGRAINOSUS, NOT INTRACTABLE: Primary | ICD-10-CM

## 2025-06-05 PROCEDURE — 99213 OFFICE O/P EST LOW 20 MIN: CPT

## 2025-06-05 PROCEDURE — 3008F BODY MASS INDEX DOCD: CPT

## 2025-06-05 RX ORDER — OLMESARTAN MEDOXOMIL 5 MG/1
5 TABLET, FILM COATED ORAL
COMMUNITY
Start: 2025-05-21

## 2025-06-05 ASSESSMENT — PAIN SCALES - GENERAL: PAINLEVEL_OUTOF10: 0-NO PAIN

## 2025-06-05 NOTE — PATIENT INSTRUCTIONS
Dear Lily,    Thank you for coming to [facility]. It was a pleasure caring for you today.  The best way to contact me for medication refills or questions about your care is through [employer].  Otherwise, you can contact the office by phone: [phone].    Charito Bryant, KELSEY-CNP    PLAN:  Continue current medication regimen  Follow up in 1 year, sooner if needed

## 2025-06-10 ENCOUNTER — APPOINTMENT (OUTPATIENT)
Dept: PRIMARY CARE | Facility: CLINIC | Age: 35
End: 2025-06-10
Payer: COMMERCIAL

## 2025-06-10 VITALS
SYSTOLIC BLOOD PRESSURE: 114 MMHG | HEIGHT: 67 IN | OXYGEN SATURATION: 100 % | HEART RATE: 64 BPM | RESPIRATION RATE: 16 BRPM | WEIGHT: 162.1 LBS | TEMPERATURE: 97.6 F | BODY MASS INDEX: 25.44 KG/M2 | DIASTOLIC BLOOD PRESSURE: 66 MMHG

## 2025-06-10 DIAGNOSIS — E66.3 OVERWEIGHT WITH BODY MASS INDEX (BMI) OF 25 TO 25.9 IN ADULT: ICD-10-CM

## 2025-06-10 DIAGNOSIS — E01.0 THYROMEGALY: ICD-10-CM

## 2025-06-10 DIAGNOSIS — T75.3XXD MOTION SICKNESS, SUBSEQUENT ENCOUNTER: ICD-10-CM

## 2025-06-10 DIAGNOSIS — R03.0 PREHYPERTENSION: ICD-10-CM

## 2025-06-10 DIAGNOSIS — Z00.00 ANNUAL PHYSICAL EXAM: Primary | ICD-10-CM

## 2025-06-10 DIAGNOSIS — Z13.220 LIPID SCREENING: ICD-10-CM

## 2025-06-10 PROCEDURE — 99395 PREV VISIT EST AGE 18-39: CPT | Performed by: FAMILY MEDICINE

## 2025-06-10 PROCEDURE — 3008F BODY MASS INDEX DOCD: CPT | Performed by: FAMILY MEDICINE

## 2025-06-10 PROCEDURE — 1036F TOBACCO NON-USER: CPT | Performed by: FAMILY MEDICINE

## 2025-06-10 RX ORDER — SCOPOLAMINE 1 MG/3D
1 PATCH, EXTENDED RELEASE TRANSDERMAL
Qty: 9 PATCH | Refills: 0 | Status: CANCELLED | OUTPATIENT
Start: 2025-06-10

## 2025-06-10 ASSESSMENT — PATIENT HEALTH QUESTIONNAIRE - PHQ9
1. LITTLE INTEREST OR PLEASURE IN DOING THINGS: NOT AT ALL
2. FEELING DOWN, DEPRESSED OR HOPELESS: NOT AT ALL
SUM OF ALL RESPONSES TO PHQ9 QUESTIONS 1 AND 2: 0

## 2025-06-10 ASSESSMENT — ANXIETY QUESTIONNAIRES
1. FEELING NERVOUS, ANXIOUS, OR ON EDGE: NOT AT ALL
GAD7 TOTAL SCORE: 0
7. FEELING AFRAID AS IF SOMETHING AWFUL MIGHT HAPPEN: NOT AT ALL
2. NOT BEING ABLE TO STOP OR CONTROL WORRYING: NOT AT ALL
6. BECOMING EASILY ANNOYED OR IRRITABLE: NOT AT ALL
5. BEING SO RESTLESS THAT IT IS HARD TO SIT STILL: NOT AT ALL
4. TROUBLE RELAXING: NOT AT ALL
3. WORRYING TOO MUCH ABOUT DIFFERENT THINGS: NOT AT ALL
IF YOU CHECKED OFF ANY PROBLEMS ON THIS QUESTIONNAIRE, HOW DIFFICULT HAVE THESE PROBLEMS MADE IT FOR YOU TO DO YOUR WORK, TAKE CARE OF THINGS AT HOME, OR GET ALONG WITH OTHER PEOPLE: NOT DIFFICULT AT ALL

## 2025-06-10 ASSESSMENT — PROMIS GLOBAL HEALTH SCALE
RATE_AVERAGE_PAIN: 0
RATE_MENTAL_HEALTH: VERY GOOD
CARRYOUT_PHYSICAL_ACTIVITIES: COMPLETELY
RATE_AVERAGE_FATIGUE: MILD
RATE_PHYSICAL_HEALTH: VERY GOOD
CARRYOUT_SOCIAL_ACTIVITIES: GOOD
EMOTIONAL_PROBLEMS: NEVER
RATE_SOCIAL_SATISFACTION: VERY GOOD
RATE_QUALITY_OF_LIFE: VERY GOOD
RATE_GENERAL_HEALTH: VERY GOOD

## 2025-06-10 ASSESSMENT — ENCOUNTER SYMPTOMS
OCCASIONAL FEELINGS OF UNSTEADINESS: 0
DEPRESSION: 0
LOSS OF SENSATION IN FEET: 0

## 2025-06-10 NOTE — PROGRESS NOTES
"Subjective   Patient ID: Lily Hernandez is a 34 y.o. female who presents for Physical . I last saw the patient on 8/29/2024  .     HPI  Patient has no medical complaints today or refill requests for rooming MA.    Patient wonders if she may have ADD. She has difficulty with finishing her tasks before moving on to the next one. She was a straight A student in school. She has 2 sons at home age is 4 and 6.     Past medical, surgical, and family history reviewed.  Reviewed and documented all medications   Pt eating well, exercising as tolerated and taking medications as directed.      Review of Systems  Except positives as noted in the CC & HPI      Constitutional: Denies fevers, chills, night sweats, fatigue, weight changes, change in appetite    Eyes: Denies blurry vision, double vision    ENT: Denies otalgia, trouble hearing, tinnitus, vertigo, nasal congestion, rhinorrhea, sore throat    Neck: Denies swelling, masses    Cardiovascular: Denies chest pain, palpitations, edema, orthopnea, syncope    Respiratory: Denies dyspnea, cough, wheezing, postural nocturnal dyspnea    Gastrointestinal: Denies abdominal pain, nausea, vomiting, diarrhea, constipation, melena, hematochezia    Genitourinary: Denies dysuria, hematuria  Musculoskeletal: Denies back pain, neck pain, arthralgias, myalgias    Integumentary: Denies skin lesions, rashes, masses    Neurological: Denies dizziness, headaches, confusion, limb weakness, paresthesias, syncope, convulsions    Psychiatric: Denies depression, anxiety, homicidal ideations, suicidal ideations, sleep disturbances    Endocrine: Denies polyphagia, polydipsia, polyuria, weakness, hair thinning, heat intolerance, cold intolerance, weight changes    Heme/Lymph: Denies easy bruising, easy bleeding, swollen glands    Objective   Visit Vitals  /66   Pulse 64   Temp 36.4 °C (97.6 °F)   Resp 16   Ht 1.702 m (5' 7\")   Wt 73.5 kg (162 lb 1.6 oz)   SpO2 100%   BMI 25.39 kg/m²   Smoking Status " Never   BSA 1.86 m²       Physical Exam    Gen. Appearance - well-developed, well-nourished, 32 y.o., White female in no acute distress.      Skin - warm, pink and dry without rash or concerning lesions.      Mental Status - alert and oriented times 3. Normal mood and affect appropriate to mood.      Ears - TMs shiny and move well with insufflation. Ear canals are clear bilaterally.      Mouth - pharynx is pink without exudates. Dentition is normal appearing. Tongue and uvula move in the midline.      Neck - supple without lymphadenopathy. Carotid pulses are normal without bruits. Thyroid is mildly enlarged in midline without nodules.      Chest - lungs are clear to auscultation without rales, rhonchi or wheezes.     Heart - regular, rate, and rhythm without murmurs, rubs or gallops.      Abdomen - soft, flat, nontender, nondistended. No masses, hepatomegaly or splenomegaly is noted. No rebound, rigidity or guarding is noted. Bowel sounds are normoactive. Scar of the mid abdomen from nephrectomy without evidence for hernia.     Extremities - no cyanosis, clubbing or edema. Pedal pulses are 2+ normal at the dorsalis pedis and posterior tibial pulses bilaterally.      Neurological - cranial nerves II through XII are grossly intact. Motor strength 5/5 at all fours.    Assessment   1. Annual physical exam        2. Prehypertension  CBC and Auto Differential    Comprehensive Metabolic Panel    CBC and Auto Differential    Comprehensive Metabolic Panel      3. Thyromegaly  TSH with reflex to Free T4 if abnormal    TSH with reflex to Free T4 if abnormal      4. Lipid screening  Lipid Panel    Lipid Panel      5. Motion sickness, subsequent encounter        6. Overweight with body mass index (BMI) of 25 to 25.9 in adult          Patient to continue current medications (with any exceptions as noted) and diet. Follow-up in 12 month(s) otherwise as needed.      Patient is to return for fasting CBC and Auto Differential,  Comprehensive Metabolic Panel, Lipid profile, TSH with reflex free T4 if abnormal   labs at their convenience prior to their next appointment. Fasting is nothing to eat or drink except water or black coffee for 8-12 hours. Will call patient with results when available.       Scribe Attestation  By signing my name below, I, Светланаnavjot Peralta , Scribe   attest that this documentation has been prepared under the direction and in the presence of Gilbert Khan MD.      This note has been transcribed using a medical scribe and there is a possibility of unintentional typing misprints.     All medical record entries made by the scribe were at my direction and personally dictated by me. I have reviewed the chart and agree that the record accurately reflects my personal performance of the history, physical exam, discussion, and plan.     GILBERT KHAN M.D.

## 2025-06-19 DIAGNOSIS — G43.709 CHRONIC MIGRAINE WITHOUT AURA WITHOUT STATUS MIGRAINOSUS, NOT INTRACTABLE: ICD-10-CM

## 2025-07-03 DIAGNOSIS — T75.3XXD MOTION SICKNESS, SUBSEQUENT ENCOUNTER: Primary | ICD-10-CM

## 2025-07-03 DIAGNOSIS — T75.3XXD MOTION SICKNESS, SUBSEQUENT ENCOUNTER: ICD-10-CM

## 2025-07-03 RX ORDER — SCOPOLAMINE 1 MG/3D
1 PATCH, EXTENDED RELEASE TRANSDERMAL
Qty: 4 PATCH | Refills: 1 | Status: SHIPPED | OUTPATIENT
Start: 2025-07-03

## 2025-07-03 RX ORDER — SCOPOLAMINE 1 MG/3D
1 PATCH, EXTENDED RELEASE TRANSDERMAL
Qty: 4 PATCH | Refills: 1 | Status: SHIPPED | OUTPATIENT
Start: 2025-07-03 | End: 2025-07-03 | Stop reason: SDUPTHER

## 2025-09-05 LAB
NON-UH HIE A/G RATIO: 1.1
NON-UH HIE ALB: 3.6 G/DL (ref 3.4–5)
NON-UH HIE ALK PHOS: 64 UNIT/L (ref 45–117)
NON-UH HIE BASO COUNT: 0.03 X1000 (ref 0–0.2)
NON-UH HIE BASOS %: 0.5 %
NON-UH HIE BILIRUBIN, TOTAL: 1.1 MG/DL (ref 0.3–1.2)
NON-UH HIE BUN/CREAT RATIO: 15.6
NON-UH HIE BUN: 14 MG/DL (ref 9–23)
NON-UH HIE CALCIUM: 9 MG/DL (ref 8.7–10.4)
NON-UH HIE CALCULATED LDL CHOLESTEROL: 70 MG/DL (ref 60–130)
NON-UH HIE CALCULATED OSMOLALITY: 276 MOSM/KG (ref 275–295)
NON-UH HIE CHLORIDE: 105 MMOL/L (ref 98–107)
NON-UH HIE CHOLESTEROL: 133 MG/DL (ref 100–200)
NON-UH HIE CO2, VENOUS: 29 MMOL/L (ref 20–31)
NON-UH HIE CREATININE: 0.9 MG/DL (ref 0.5–0.8)
NON-UH HIE DIFF?: NORMAL
NON-UH HIE EOS COUNT: 0.09 X1000 (ref 0–0.5)
NON-UH HIE EOSIN %: 1.5 %
NON-UH HIE GFR AA: >60
NON-UH HIE GLOBULIN: 3.2 G/DL
NON-UH HIE GLOMERULAR FILTRATION RATE: >60 ML/MIN/1.73M?
NON-UH HIE GLUCOSE: 87 MG/DL (ref 74–106)
NON-UH HIE GOT: 23 UNIT/L (ref 15–37)
NON-UH HIE GPT: 35 UNIT/L (ref 10–49)
NON-UH HIE HCT: 38.4 % (ref 36–46)
NON-UH HIE HDL CHOLESTEROL: 39 MG/DL (ref 40–60)
NON-UH HIE HGB: 12.8 G/DL (ref 12–16)
NON-UH HIE INSTR WBC: 5.9
NON-UH HIE K: 4 MMOL/L (ref 3.5–5.1)
NON-UH HIE LYMPH %: 25.1 %
NON-UH HIE LYMPH COUNT: 1.47 X1000 (ref 1.2–4.8)
NON-UH HIE MCH: 28.8 PG (ref 27–34)
NON-UH HIE MCHC: 33.2 G/DL (ref 32–37)
NON-UH HIE MCV: 86.6 FL (ref 80–100)
NON-UH HIE MONO %: 11 %
NON-UH HIE MONO COUNT: 0.65 X1000 (ref 0.1–1)
NON-UH HIE MPV: 8.1 FL (ref 7.4–10.4)
NON-UH HIE NA: 138 MMOL/L (ref 135–145)
NON-UH HIE NEUTROPHIL %: 61.9 %
NON-UH HIE NEUTROPHIL COUNT (ANC): 3.64 X1000 (ref 1.4–8.8)
NON-UH HIE NUCLEATED RBC: 0 /100WBC
NON-UH HIE PLATELET: 242 X10 (ref 150–450)
NON-UH HIE RBC: 4.43 X10 (ref 4.2–5.4)
NON-UH HIE RDW: 13.5 % (ref 11.5–14.5)
NON-UH HIE TOTAL CHOL/HDL CHOL RATIO: 3.4
NON-UH HIE TOTAL PROTEIN: 6.8 G/DL (ref 5.7–8.2)
NON-UH HIE TRIGLYCERIDES: 118 MG/DL (ref 30–150)
NON-UH HIE TSH: 1.54 UIU/ML (ref 0.55–4.78)
NON-UH HIE WBC: 5.9 X10 (ref 4.5–11)

## 2026-05-14 ENCOUNTER — APPOINTMENT (OUTPATIENT)
Dept: NEUROLOGY | Facility: CLINIC | Age: 36
End: 2026-05-14
Payer: COMMERCIAL